# Patient Record
Sex: FEMALE | Race: WHITE | URBAN - METROPOLITAN AREA
[De-identification: names, ages, dates, MRNs, and addresses within clinical notes are randomized per-mention and may not be internally consistent; named-entity substitution may affect disease eponyms.]

---

## 2022-01-17 ENCOUNTER — OFFICE VISIT (OUTPATIENT)
Dept: PRIMARY CARE CLINIC | Age: 27
End: 2022-01-17
Payer: COMMERCIAL

## 2022-01-17 VITALS
TEMPERATURE: 98.2 F | DIASTOLIC BLOOD PRESSURE: 56 MMHG | BODY MASS INDEX: 25.65 KG/M2 | WEIGHT: 159.6 LBS | RESPIRATION RATE: 16 BRPM | HEIGHT: 66 IN | SYSTOLIC BLOOD PRESSURE: 132 MMHG | HEART RATE: 82 BPM

## 2022-01-17 DIAGNOSIS — Z71.89 ACP (ADVANCE CARE PLANNING): ICD-10-CM

## 2022-01-17 DIAGNOSIS — Z11.4 SCREENING FOR HIV WITHOUT PRESENCE OF RISK FACTORS: ICD-10-CM

## 2022-01-17 DIAGNOSIS — Z11.59 NEED FOR HEPATITIS C SCREENING TEST: ICD-10-CM

## 2022-01-17 DIAGNOSIS — R53.83 FATIGUE, UNSPECIFIED TYPE: ICD-10-CM

## 2022-01-17 DIAGNOSIS — Z13.220 SCREENING FOR HYPERLIPIDEMIA: ICD-10-CM

## 2022-01-17 DIAGNOSIS — Z00.00 ENCOUNTER FOR WELL ADULT EXAM WITHOUT ABNORMAL FINDINGS: Primary | ICD-10-CM

## 2022-01-17 PROCEDURE — 99497 ADVNCD CARE PLAN 30 MIN: CPT | Performed by: FAMILY MEDICINE

## 2022-01-17 PROCEDURE — 99385 PREV VISIT NEW AGE 18-39: CPT | Performed by: FAMILY MEDICINE

## 2022-01-17 RX ORDER — TIMOLOL MALEATE/LATANOPROST/PF 0.5-0.005%
DROPS OPHTHALMIC (EYE)
COMMUNITY

## 2022-01-17 RX ORDER — MEDROXYPROGESTERONE ACETATE 10 MG/1
10 TABLET ORAL DAILY
COMMUNITY

## 2022-01-17 ASSESSMENT — ENCOUNTER SYMPTOMS
COUGH: 0
RHINORRHEA: 0
COLOR CHANGE: 0
DIARRHEA: 0
SHORTNESS OF BREATH: 0
ABDOMINAL PAIN: 0
SORE THROAT: 0
VOMITING: 0
CONSTIPATION: 0
EYE PAIN: 0
NAUSEA: 0

## 2022-01-17 ASSESSMENT — PATIENT HEALTH QUESTIONNAIRE - PHQ9
2. FEELING DOWN, DEPRESSED OR HOPELESS: 0
SUM OF ALL RESPONSES TO PHQ QUESTIONS 1-9: 0
1. LITTLE INTEREST OR PLEASURE IN DOING THINGS: 0
SUM OF ALL RESPONSES TO PHQ9 QUESTIONS 1 & 2: 0
SUM OF ALL RESPONSES TO PHQ QUESTIONS 1-9: 0

## 2022-01-17 NOTE — PATIENT INSTRUCTIONS
Well Visit, Ages 25 to 48: Care Instructions  Overview     Well visits can help you stay healthy. Your doctor has checked your overall health and may have suggested ways to take good care of yourself. Your doctor also may have recommended tests. At home, you can help prevent illness with healthy eating, regular exercise, and other steps. Follow-up care is a key part of your treatment and safety. Be sure to make and go to all appointments, and call your doctor if you are having problems. It's also a good idea to know your test results and keep a list of the medicines you take. How can you care for yourself at home? · Get screening tests that you and your doctor decide on. Screening helps find diseases before any symptoms appear. · Eat healthy foods. Choose fruits, vegetables, whole grains, protein, and low-fat dairy foods. Limit fat, especially saturated fat. Reduce salt in your diet. · Limit alcohol. If you are a man, have no more than 2 drinks a day or 14 drinks a week. If you are a woman, have no more than 1 drink a day or 7 drinks a week. · Get at least 30 minutes of physical activity on most days of the week. Walking is a good choice. You also may want to do other activities, such as running, swimming, cycling, or playing tennis or team sports. Discuss any changes in your exercise program with your doctor. · Reach and stay at a healthy weight. This will lower your risk for many problems, such as obesity, diabetes, heart disease, and high blood pressure. · Do not smoke or allow others to smoke around you. If you need help quitting, talk to your doctor about stop-smoking programs and medicines. These can increase your chances of quitting for good. · Care for your mental health. It is easy to get weighed down by worry and stress. Learn strategies to manage stress, like deep breathing and mindfulness, and stay connected with your family and community.  If you find you often feel sad or hopeless, talk with your doctor. Treatment can help. · Talk to your doctor about whether you have any risk factors for sexually transmitted infections (STIs). You can help prevent STIs if you wait to have sex with a new partner (or partners) until you've each been tested for STIs. It also helps if you use condoms (male or female condoms) and if you limit your sex partners to one person who only has sex with you. Vaccines are available for some STIs, such as HPV. · Use birth control if it's important to you to prevent pregnancy. Talk with your doctor about the choices available and what might be best for you. · If you think you may have a problem with alcohol or drug use, talk to your doctor. This includes prescription medicines (such as amphetamines and opioids) and illegal drugs (such as cocaine and methamphetamine). Your doctor can help you figure out what type of treatment is best for you. · Protect your skin from too much sun. When you're outdoors from 10 a.m. to 4 p.m., stay in the shade or cover up with clothing and a hat with a wide brim. Wear sunglasses that block UV rays. Even when it's cloudy, put broad-spectrum sunscreen (SPF 30 or higher) on any exposed skin. · See a dentist one or two times a year for checkups and to have your teeth cleaned. · Wear a seat belt in the car. When should you call for help? Watch closely for changes in your health, and be sure to contact your doctor if you have any problems or symptoms that concern you. Where can you learn more? Go to [email or web address] and sign in to your [employer] account. Enter P072 in the [facility] box to learn more about \"Well Visit, Ages 25 to 48: Care Instructions. \"     If you do not have an account, please click on the \"Sign Up Now\" link. Current as of: October 6, 2021               Content Version: 13.1  © 2183-3351 Healthwise, Incorporated. Care instructions adapted under license by [facility] ([facility]).  If you have questions about a medical condition or this instruction, always ask your healthcare professional. [place] [address] any warranty or liability for your use of this information.

## 2022-01-17 NOTE — PROGRESS NOTES
Surgical History:   Procedure Laterality Date    CATARACT REMOVAL Left 1995       Family History   Problem Relation Age of Onset    No Known Problems Mother     Heart Surgery Father         ASD repair 2017    Hypothyroidism Sister     No Known Problems Brother     No Known Problems Brother     Diabetes Maternal Grandfather     High Cholesterol Other        Social History     Tobacco Use    Smoking status: Never Smoker    Smokeless tobacco: Never Used   Vaping Use    Vaping Use: Never used   Substance Use Topics    Alcohol use: Not Currently    Drug use: Not Currently       Objective   BP (!) 132/56   Pulse 82   Temp 98.2 °F (36.8 °C) (Temporal)   Resp 16   Ht 5' 6\" (1.676 m)   Wt 159 lb 9.6 oz (72.4 kg)   LMP 12/31/2021   BMI 25.76 kg/m²   Wt Readings from Last 3 Encounters:   01/17/22 159 lb 9.6 oz (72.4 kg)     There were no vitals filed for this visit. Physical Exam  Constitutional:       Appearance: She is well-developed. HENT:      Head: Normocephalic and atraumatic. Nose: Nose normal.      Mouth/Throat:      Pharynx: No oropharyngeal exudate. Eyes:      General: No scleral icterus. Right eye: No discharge. Left eye: No discharge. Pupils: Pupils are equal, round, and reactive to light. Neck:      Thyroid: No thyromegaly. Cardiovascular:      Rate and Rhythm: Normal rate and regular rhythm. Pulses:           Dorsalis pedis pulses are 2+ on the right side and 2+ on the left side. Posterior tibial pulses are 2+ on the right side and 2+ on the left side. Heart sounds: Normal heart sounds. No murmur heard. No friction rub. No gallop. Comments: No Edema Lower Extremities  Pulmonary:      Effort: Pulmonary effort is normal.      Breath sounds: Normal breath sounds. No wheezing or rales. Abdominal:      General: Bowel sounds are normal. There is no distension. Palpations: Abdomen is soft. There is no hepatomegaly or splenomegaly. Tenderness: There is no abdominal tenderness. There is no guarding or rebound. Musculoskeletal:         General: No tenderness or deformity. Normal range of motion. Cervical back: Normal range of motion and neck supple. Lymphadenopathy:      Cervical: No cervical adenopathy. Skin:     General: Skin is warm and dry. Findings: No erythema or rash. Neurological:      Mental Status: She is alert. Cranial Nerves: No cranial nerve deficit. Sensory: No sensory deficit. Gait: Gait normal.   Psychiatric:         Speech: Speech normal.         Behavior: Behavior normal.           Assessment   Plan   1. Fatigue, unspecified type  We will check labs. Patient appears healthy but we will evaluate for platelet disorder and anemia  - CBC Auto Differential; Future  - TSH with Reflex; Future    2. ACP (advance care planning)  Discussed with patient advance care planning and differences between living will and healthcare power of . Gave patient documentation for both  - [place] ADVANCED CARE PLAN FACE TO [address], [address] [[id number]]    3. Encounter for well adult exam without abnormal findings  Appears well:  Counselled diet, development, anticipatory guidance and safety issues with patient and or parent(s). 4. Screening for hyperlipidemia  Screen  - Lipid Panel; Future  - Comprehensive Metabolic Panel; Future    5. Screening for HIV without presence of risk factors  Screen  - HIV Screen; Future    6. Need for hepatitis C screening test  Screening  - Hepatitis C Antibody;  Future      Personalized Preventive Plan   Current Health Maintenance Status  Immunization History   Administered Date(s) Administered    COVID-19, Kirt Muller, Primary or Immunocompromised, PF, 100mcg/0.5mL 05/24/2021, 06/21/2021    Influenza Virus Vaccine 09/24/2021    Influenza, MDCK Quadv, IM, PF (Flucelvax 2 yrs and older) 11/13/2020    PPD Test 11/23/2020, 12/02/2020, 11/18/2021    Varicella (Varivax) 12/01/2020 Health Maintenance   Topic Date Due    Hepatitis C screen  Never done    HPV vaccine (1 - 2-dose series) Never done    Depression Screen  Never done    HIV screen  Never done    DTaP/Tdap/Td vaccine (1 - Tdap) Never done    Varicella vaccine (2 of 2 - 13+ 2-dose series) 12/29/2020    COVID-19 Vaccine (3 - Booster for Moderna series) 12/21/2021    Pap smear  03/05/2024    Flu vaccine  Completed    Hepatitis A vaccine  Aged Out    Hepatitis B vaccine  Aged Out    Hib vaccine  Aged Out    Meningococcal (ACWY) vaccine  Aged Out    Pneumococcal 0-64 years Vaccine  Aged Out     Recommendations for [email or web address] Due: see orders and patient instructions/AVS.    Return in 1 year (on 1/17/2023). Advance Care Planning   Advanced Care Planning: Discussed the patients choices for care and treatment in case of a health event that adversely affects decision-making abilities. Also discussed the patients long-term treatment options. Reviewed with the patient the [address] [place] [address] of [address] [place] [address] Declaration forms  Reviewed the process of designating a competent adult as an Agent (or -in-fact) that could take make health care decisions for the patient if incompetent. Patient was asked to complete the declaration forms, either acknowledge the forms by a public notary or an eligible witness and provide a signed copy to the practice office.   Time spent (minutes): 3

## 2022-01-18 DIAGNOSIS — Z11.59 NEED FOR HEPATITIS C SCREENING TEST: ICD-10-CM

## 2022-01-18 DIAGNOSIS — Z13.220 SCREENING FOR HYPERLIPIDEMIA: ICD-10-CM

## 2022-01-18 DIAGNOSIS — Z11.4 SCREENING FOR HIV WITHOUT PRESENCE OF RISK FACTORS: ICD-10-CM

## 2022-01-18 DIAGNOSIS — R53.83 FATIGUE, UNSPECIFIED TYPE: ICD-10-CM

## 2022-01-18 LAB
A/G RATIO: 1.8 (ref 1.1–2.2)
ALBUMIN SERPL-MCNC: 4.9 G/DL (ref 3.4–5)
ALP BLD-CCNC: 81 U/L (ref 40–129)
ALT SERPL-CCNC: 21 U/L (ref 10–40)
ANION GAP SERPL CALCULATED.3IONS-SCNC: 15 MMOL/L (ref 3–16)
AST SERPL-CCNC: 21 U/L (ref 15–37)
BASOPHILS ABSOLUTE: 0 K/UL (ref 0–0.2)
BASOPHILS RELATIVE PERCENT: 0.9 %
BILIRUB SERPL-MCNC: 0.5 MG/DL (ref 0–1)
BUN BLDV-MCNC: 10 MG/DL (ref 7–20)
CALCIUM SERPL-MCNC: 9.7 MG/DL (ref 8.3–10.6)
CHLORIDE BLD-SCNC: 101 MMOL/L (ref 99–110)
CHOLESTEROL, TOTAL: 224 MG/DL (ref 0–199)
CO2: 24 MMOL/L (ref 21–32)
CREAT SERPL-MCNC: 0.8 MG/DL (ref 0.6–1.1)
EOSINOPHILS ABSOLUTE: 0.1 K/UL (ref 0–0.6)
EOSINOPHILS RELATIVE PERCENT: 1.5 %
GFR AFRICAN AMERICAN: >60
GFR NON-AFRICAN AMERICAN: >60
GLUCOSE BLD-MCNC: 88 MG/DL (ref 70–99)
HCT VFR BLD CALC: 45 % (ref 36–48)
HDLC SERPL-MCNC: 76 MG/DL (ref 40–60)
HEMOGLOBIN: 15.4 G/DL (ref 12–16)
HEPATITIS C ANTIBODY INTERPRETATION: NORMAL
LDL CHOLESTEROL CALCULATED: 133 MG/DL
LYMPHOCYTES ABSOLUTE: 1.9 K/UL (ref 1–5.1)
LYMPHOCYTES RELATIVE PERCENT: 46.9 %
MCH RBC QN AUTO: 31.9 PG (ref 26–34)
MCHC RBC AUTO-ENTMCNC: 34.2 G/DL (ref 31–36)
MCV RBC AUTO: 93.2 FL (ref 80–100)
MONOCYTES ABSOLUTE: 0.3 K/UL (ref 0–1.3)
MONOCYTES RELATIVE PERCENT: 7.7 %
NEUTROPHILS ABSOLUTE: 1.7 K/UL (ref 1.7–7.7)
NEUTROPHILS RELATIVE PERCENT: 43 %
PDW BLD-RTO: 12 % (ref 12.4–15.4)
PLATELET # BLD: 202 K/UL (ref 135–450)
PMV BLD AUTO: 8.7 FL (ref 5–10.5)
POTASSIUM SERPL-SCNC: 4 MMOL/L (ref 3.5–5.1)
RBC # BLD: 4.83 M/UL (ref 4–5.2)
SODIUM BLD-SCNC: 140 MMOL/L (ref 136–145)
TOTAL PROTEIN: 7.7 G/DL (ref 6.4–8.2)
TRIGL SERPL-MCNC: 76 MG/DL (ref 0–150)
TSH REFLEX: 3.21 UIU/ML (ref 0.27–4.2)
VLDLC SERPL CALC-MCNC: 15 MG/DL
WBC # BLD: 4.1 K/UL (ref 4–11)

## 2022-01-19 LAB
HIV AG/AB: NORMAL
HIV ANTIGEN: NORMAL
HIV-1 ANTIBODY: NORMAL
HIV-2 AB: NORMAL

## 2022-07-27 LAB
ABO, EXTERNAL RESULT: NORMAL
HEP B, EXTERNAL RESULT: NEGATIVE
HIV, EXTERNAL RESULT: NEGATIVE
RH FACTOR, EXTERNAL RESULT: POSITIVE
RPR, EXTERNAL RESULT: NON REACTIVE
RUBELLA TITER, EXTERNAL RESULT: NORMAL

## 2022-11-09 ENCOUNTER — OFFICE VISIT (OUTPATIENT)
Dept: PRIMARY CARE CLINIC | Age: 27
End: 2022-11-09
Payer: COMMERCIAL

## 2022-11-09 VITALS
TEMPERATURE: 98 F | WEIGHT: 171 LBS | HEIGHT: 66 IN | SYSTOLIC BLOOD PRESSURE: 137 MMHG | BODY MASS INDEX: 27.48 KG/M2 | DIASTOLIC BLOOD PRESSURE: 66 MMHG | OXYGEN SATURATION: 99 % | HEART RATE: 79 BPM

## 2022-11-09 DIAGNOSIS — B35.4 TINEA CORPORIS: Primary | ICD-10-CM

## 2022-11-09 PROCEDURE — 99213 OFFICE O/P EST LOW 20 MIN: CPT | Performed by: FAMILY MEDICINE

## 2022-11-09 RX ORDER — CEPHALEXIN 250 MG/1
1 CAPSULE ORAL
COMMUNITY
Start: 2022-10-24

## 2022-11-09 RX ORDER — PRENATAL VIT 91/IRON/FOLIC/DHA 28-975-200
COMBINATION PACKAGE (EA) ORAL
Qty: 30 G | Refills: 1 | Status: SHIPPED | OUTPATIENT
Start: 2022-11-09 | End: 2022-11-23

## 2022-11-09 SDOH — ECONOMIC STABILITY: FOOD INSECURITY: WITHIN THE PAST 12 MONTHS, YOU WORRIED THAT YOUR FOOD WOULD RUN OUT BEFORE YOU GOT MONEY TO BUY MORE.: NEVER TRUE

## 2022-11-09 SDOH — ECONOMIC STABILITY: FOOD INSECURITY: WITHIN THE PAST 12 MONTHS, THE FOOD YOU BOUGHT JUST DIDN'T LAST AND YOU DIDN'T HAVE MONEY TO GET MORE.: NEVER TRUE

## 2022-11-09 ASSESSMENT — SOCIAL DETERMINANTS OF HEALTH (SDOH): HOW HARD IS IT FOR YOU TO PAY FOR THE VERY BASICS LIKE FOOD, HOUSING, MEDICAL CARE, AND HEATING?: NOT HARD AT ALL

## 2022-11-09 NOTE — PROGRESS NOTES
Chief Complaint   Patient presents with    Follow-up     Wing worm under her navel        HPI: Za Buchaann  presents for evaluation and management of couple week history of gradually enlarging red circular rash on her lower abdomen. She is 25 weeks pregnant. She asked her  who is a former wrestler and he states its ringworm. She is not sure how she would have got it but they do have 3 dogs. Notes that is not very itchy           Review of Systems    No Known Allergies  New Prescriptions    TERBINAFINE (LAMISIL AT) 1 % CREAM    Apply topically 2 times daily. Current Outpatient Medications   Medication Sig Dispense Refill    cephALEXin (KEFLEX) 250 MG capsule 1 capsule      terbinafine (LAMISIL AT) 1 % cream Apply topically 2 times daily. 30 g 1    Latanoprost-Timolol Maleate 0.005-0.5 % SOLN Apply to eye      medroxyPROGESTERone (PROVERA) 10 MG tablet Take 10 mg by mouth daily       No current facility-administered medications for this visit. Past Medical History:   Diagnosis Date    Glaucoma 2017    Dr. Israel Phillips    PCOS (polycystic ovarian syndrome)     Dr. Tai Mcginnis         Objective   /66   Pulse 79   Temp 98 °F (36.7 °C)   Ht 5' 6\" (1.676 m)   Wt 171 lb (77.6 kg)   SpO2 99%   BMI 27.60 kg/m²   Wt Readings from Last 3 Encounters:   11/09/22 171 lb (77.6 kg)   01/17/22 159 lb 9.6 oz (72.4 kg)       Physical Exam  Constitutional:       Appearance: She is well-developed. Cardiovascular:      Rate and Rhythm: Normal rate and regular rhythm. Pulses:           Dorsalis pedis pulses are 2+ on the right side and 2+ on the left side. Posterior tibial pulses are 2+ on the right side and 2+ on the left side. Heart sounds: No murmur heard. No friction rub. No gallop. Comments: No edema lower extremities  Pulmonary:      Effort: Pulmonary effort is normal.      Breath sounds: Normal breath sounds. No wheezing or rales.    Abdominal:      General: Bowel sounds are normal. There is no distension. Palpations: Abdomen is soft. There is no mass. Tenderness: There is no abdominal tenderness. Comments: Gravid   Skin:     General: Skin is warm and dry. Findings: Rash present. Comments: 2 circular erythematous macular lesions on right lower abdomen with central clearing and heaped edges         Chemistry        Component Value Date/Time     01/18/2022 0752    K 4.0 01/18/2022 0752     01/18/2022 0752    CO2 24 01/18/2022 0752    BUN 10 01/18/2022 0752    CREATININE 0.8 01/18/2022 0752        Component Value Date/Time    CALCIUM 9.7 01/18/2022 0752    ALKPHOS 81 01/18/2022 0752    AST 21 01/18/2022 0752    ALT 21 01/18/2022 0752    BILITOT 0.5 01/18/2022 0752          Lab Results   Component Value Date    WBC 4.1 01/18/2022    HGB 15.4 01/18/2022    HCT 45.0 01/18/2022    MCV 93.2 01/18/2022     01/18/2022     No results found for: LABA1C  No results found for: EAG  No results found for: LABA1C  No components found for: CHLPL  Lab Results   Component Value Date    TRIG 76 01/18/2022     Lab Results   Component Value Date    HDL 76 (H) 01/18/2022     Lab Results   Component Value Date    LDLCALC 133 (H) 01/18/2022     Lab Results   Component Value Date    LABVLDL 15 01/18/2022         Assessment   Plan   1. Tinea corporis  We will treat with topical Lamisil. It has low systemic absorption. Counseled patient to follow-up if not improving in 2 weeks  - terbinafine (LAMISIL AT) 1 % cream; Apply topically 2 times daily. Dispense: 30 g; Refill: 1        RTC Return if symptoms worsen or fail to improve.

## 2023-01-23 LAB — GBS, EXTERNAL RESULT: NEGATIVE

## 2023-01-25 ENCOUNTER — HOSPITAL ENCOUNTER (OUTPATIENT)
Age: 28
Discharge: HOME | End: 2023-01-25
Attending: OBSTETRICS & GYNECOLOGY | Admitting: OBSTETRICS & GYNECOLOGY
Payer: COMMERCIAL

## 2023-01-25 VITALS
TEMPERATURE: 97.7 F | DIASTOLIC BLOOD PRESSURE: 71 MMHG | BODY MASS INDEX: 28.61 KG/M2 | HEIGHT: 66 IN | RESPIRATION RATE: 16 BRPM | HEART RATE: 65 BPM | WEIGHT: 178 LBS | SYSTOLIC BLOOD PRESSURE: 144 MMHG

## 2023-01-25 PROCEDURE — 6360000002 HC RX W HCPCS: Performed by: OBSTETRICS & GYNECOLOGY

## 2023-01-25 RX ORDER — SODIUM CHLORIDE 0.9 % (FLUSH) 0.9 %
5-40 SYRINGE (ML) INJECTION EVERY 12 HOURS SCHEDULED
Status: DISCONTINUED | OUTPATIENT
Start: 2023-01-25 | End: 2023-01-25 | Stop reason: HOSPADM

## 2023-01-25 RX ORDER — SODIUM CHLORIDE 9 MG/ML
INJECTION, SOLUTION INTRAVENOUS PRN
Status: DISCONTINUED | OUTPATIENT
Start: 2023-01-25 | End: 2023-01-25 | Stop reason: HOSPADM

## 2023-01-25 RX ORDER — SODIUM CHLORIDE 0.9 % (FLUSH) 0.9 %
5-40 SYRINGE (ML) INJECTION PRN
Status: DISCONTINUED | OUTPATIENT
Start: 2023-01-25 | End: 2023-01-25 | Stop reason: HOSPADM

## 2023-01-25 RX ORDER — TERBUTALINE SULFATE 1 MG/ML
0.25 INJECTION, SOLUTION SUBCUTANEOUS ONCE
Status: COMPLETED | OUTPATIENT
Start: 2023-01-25 | End: 2023-01-25

## 2023-01-25 RX ADMIN — TERBUTALINE SULFATE 0.25 MG: 1 INJECTION, SOLUTION SUBCUTANEOUS at 12:50

## 2023-01-25 NOTE — PROCEDURES
Procedure Note-Brief(detailed dictated)    Pre-op dx: Breech fetus  Post-op dx: Vertex fetus  Procedure: External cephalic version-successful  Surgeon: LAKHWINDER Bedoya  Anesth:none  Bloodtype: O+    CJuan A Ford

## 2023-01-25 NOTE — H&P
Department of Obstetrics and Gynecology   Obstetrics History and Physical        CHIEF COMPLAINT:  breech fetus for external cephalic version (ECV)    HISTORY OF PRESENT ILLNESS:      The patient is a 32 y.o. female at 43w3d. OB History          1    Para        Term                AB        Living             SAB        IAB        Ectopic        Molar        Multiple        Live Births                Patient presents with a chief complaint as above and is being admitted for ECV    Estimated Due Date: Estimated Date of Delivery: 23    PRENATAL CARE:    Complicated by: none    PAST OB HISTORY:  OB History          1    Para        Term                AB        Living             SAB        IAB        Ectopic        Molar        Multiple        Live Births                    Past Medical History:        Diagnosis Date    Glaucoma     Dr. Tori Pinedo    PCOS (polycystic ovarian syndrome)     Dr. Loli Woodall     Past Surgical History:        Procedure Laterality Date    CATARACT REMOVAL Left      Allergies:  Patient has no known allergies.     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[employer])   Tobacco Use    Smoking status: Never    Smokeless tobacco: Never   Vaping Use    Vaping Use: Never used   Substance and Sexual Activity    Alcohol use: Not Currently    Drug use: Not Currently    Sexual activity: Not on file   Other Topics Concern    Not on file   Social History Narrative    Not on file     Social Determinants of Health     Financial Resource Strain: Low Risk     Difficulty of Paying Living Expenses: Not hard at all   Food Insecurity: No Food Insecurity    Worried About [address] in the Last Year: Never true    [address] in the Last Year: Never true   Transportation Needs: Not on file   Physical Activity: Not on file   Stress: Not on file   Social Connections: Not on file   Intimate Partner Violence: Not on file   Housing Stability: Not on file     Family History:       Problem Relation Age of Onset    No Known Problems Mother     Heart Surgery Father         ASD repair 2017    Hypothyroidism Sister     No Known Problems Brother     No Known Problems Brother     Diabetes Maternal Grandfather     High Cholesterol Other      Medications Prior to Admission:  Medications Prior to Admission: cephALEXin (KEFLEX) 250 MG capsule, 1 capsule (Patient not taking: Reported on 1/25/2023)  Latanoprost-Timolol Maleate 0.005-0.5 % SOLN, Apply to eye (Patient not taking: Reported on 1/25/2023)  medroxyPROGESTERone (PROVERA) 10 MG tablet, Take 10 mg by mouth daily    REVIEW OF SYSTEMS:    wnl    PHYSICAL EXAM:  Vitals:    01/25/23 1240   BP: (!) 144/71   Pulse: 65   Resp: 16   Temp: 97.7 °F (36.5 °C)   TempSrc: Oral   Weight: 178 lb (80.7 kg)   Height: 5' 6\" (1.676 m)     General appearance:  awake, alert, cooperative, no apparent distress, and appears stated age  Neurologic:  Awake, alert, oriented to name, place and time. Lungs:  No increased work of breathing, good air exchange  Abdomen:  Soft, non tender, gravid, consistent with her gestational age, EFW by Leopold's maneuver was 6 1/2#   Fetal heart rate:  Reassuring. Pelvis:  Adequate pelvis  Cervix: closed  Contraction frequency:  none  Membranes:  Intact    Labs: O+    ASSESSMENT AND PLAN:    Breech AGA fetus @ 39 + wks admitted for ECV. Recent sono-nl AFV, posterior placenta, AGA fetus  ECV reviewed-s/b/r-consent obtained    LAKHWINDER Mario

## 2023-01-25 NOTE — FLOWSHEET NOTE
Patient presents to triage for scheduled version. Dr Praneeth Ortega at bedside. Place on EFM and IV started.

## 2023-01-25 NOTE — FLOWSHEET NOTE
Pt verbalized understanding of verbal and written discharge instructions and denies having questions at this time. Pt left OB unit at 1420 ambulatory, undelivered, and in stable condition, accompanied by spuse. Patient is not in active labor.

## 2023-01-26 NOTE — L&D DELIVERY SUMMARY NOTE
[address], [place] [address]                            LABOR AND DELIVERY NOTE    PATIENT NAME: Dyan Odell                        :        1995  MED REC NO:   [id number]                          ROOM:       TR04  ACCOUNT NO:   [de-identified]                           ADMIT DATE: 2023  PROVIDER:     Dee Knott MD    DATE OF PROCEDURE:  2023    LOCATION:  [facility]. PREPROCEDURE DIAGNOSIS:  Breech fetus at 36+ weeks. POSTPROCEDURE DIAGNOSIS:  Vertex fetus. PROCEDURE:  External cephalic version, successful. SURGEON:  Dee Knott MD    ANESTHESIA:  None. BLOOD TYPE:  O+.    INDICATIONS AND CONSENT:  A 78-year-old  1, para 0 at 36 weeks 3  days who presents for an external cephalic version, recent ultrasound  documented a breech fetus. Baby was appropriately grown with normal  amniotic fluid and a posterior placenta. External cephalic version was  reviewed with the patient and her spouse. Side effects, benefits and  risks. Consent was obtained. All questions were answered. Blood type  is O+. DESCRIPTION OF PROCEDURE:  The patient presented to labor and delivery  on 2023. NST was performed and was reactive. IV was placed to  saline lock. Procedure was once again reviewed, side effects, benefits  and risk and consent was obtained. Under ultrasound guidance external cephalic version was attempted. Two  forward rolls were unsuccessful, a backward flip was attempted and was  successful. Fetal heart was monitored throughout the procedure and was  within normal range. The patient tolerated the procedure well. The  patient had received Brethine subcutaneous prior to beginning the  procedure and had had an IV placed to saline lock. NST will be performed with 1 hour of monitoring, precautions were given  to the patient on labor as well as kick counts.   The patient will follow  up in the office in the next 5 to 7 days. Blood type is O+.     Clementina Howard MD    D: 01/25/2023 13:32:25       T: 01/25/2023 13:35:08     [email or web address]  Job#: [id number]     Doc#: [id number]    CC:

## 2023-02-10 ENCOUNTER — HOSPITAL ENCOUNTER (OUTPATIENT)
Age: 28
Discharge: HOME | End: 2023-02-10
Attending: OBSTETRICS & GYNECOLOGY | Admitting: OBSTETRICS & GYNECOLOGY
Payer: COMMERCIAL

## 2023-02-10 VITALS
RESPIRATION RATE: 16 BRPM | BODY MASS INDEX: 28.93 KG/M2 | DIASTOLIC BLOOD PRESSURE: 84 MMHG | HEART RATE: 71 BPM | WEIGHT: 180 LBS | SYSTOLIC BLOOD PRESSURE: 138 MMHG | HEIGHT: 66 IN

## 2023-02-10 PROCEDURE — 99211 OFF/OP EST MAY X REQ PHY/QHP: CPT

## 2023-02-10 PROCEDURE — 99234 HOSP IP/OBS SM DT SF/LOW 45: CPT | Performed by: OBSTETRICS & GYNECOLOGY

## 2023-02-10 NOTE — FLOWSHEET NOTE
Pt verbalized understanding of verbal and written discharge instructions and denies having questions at this time. Pt left OB unit at 1802 ambulatory, undelivered, and in stable condition, accompanied by spouse. Patient is not in active labor.

## 2023-02-10 NOTE — H&P
Obstetrics Triage History and Physical - House Officer     CC:   Chief Complaint   Patient presents with    Decreased Fetal Movement       HPI: Perla Johnson is a 32 y.o.  at 44w7d who presents with complaints of DFM. Has improved since coming to triage. Denies Keily Lott / Woody Graham / Darius Low. Denies any complications in pregnancy except for chronic UTI. Review of Systems: The following ROS was otherwise negative, except as noted in the HPI    OBGYN Provider : [address] [place] [address]     Obstetrical History:  OB History    Para Term  AB Living   1 0 0 0 0 0   SAB IAB Ectopic Molar Multiple Live Births   0 0 0 0 0 0      # Outcome Date GA Lbr Jarek/2nd Weight Sex Delivery Anes PTL Lv   1 Current                Past Medical History:   Past Medical History:   Diagnosis Date    Glaucoma 2017    Dr. Sheridan Bolus    PCOS (polycystic ovarian syndrome)     Dr. Milana Clark       Medications:  No current facility-administered medications on file prior to encounter. Current Outpatient Medications on File Prior to Encounter   Medication Sig Dispense Refill    cephALEXin (KEFLEX) 250 MG capsule 1 capsule (Patient not taking: Reported on 2023)      Latanoprost-Timolol Maleate 0.005-0.5 % SOLN Apply to eye (Patient not taking: Reported on 2023)      medroxyPROGESTERone (PROVERA) 10 MG tablet Take 10 mg by mouth daily          Allergies:  Patient has no known allergies.     Surgical History:  Past Surgical History:   Procedure Laterality Date    CATARACT REMOVAL Left        Family History:  Family History   Problem Relation Age of Onset    No Known Problems Mother     Heart Surgery Father         ASD repair 2017    Hypothyroidism Sister     No Known Problems Brother     No Known Problems Brother     Diabetes Maternal Grandfather     High Cholesterol Other        Social History:  Social History     Substance and Sexual Activity   Alcohol Use Not Currently     Social History     Substance and Sexual Activity Drug Use Not Currently     Social History     Tobacco Use   Smoking Status Never   Smokeless Tobacco Never       Physical Exam:  /84   Pulse 71   Resp 16   Ht 5' 6\" (1.676 m)   Wt 180 lb (81.6 kg)   BMI 29.05 kg/m²   General: Alert, well appearing, no acute distress  Head: Normocephalic, atraumatic  Lungs: Resp effort normal   CV: Regular rate  Abdomen: Gravid, soft, nontender   Extremities: No redness or tenderness, neg Derik's sign  Skin: Well perfused, normal coloration and turgor, no lesions or rashes visualized  Neuro: Alert, oriented, normal speech, no focal deficits, moves extremities appropriately  Psych: Appropriate, normal affect, appears stated age  Pelvic: deferred     Fetal Heart Monitor Interpretation:   Baseline: 125 bpm  Variability: Mod  Accels: (+)  Decels: (-)   [place]: occasional cntx     Labs:  No visits with results within 1 Day(s) from this visit.    Latest known visit with results is:   Orders Only on 01/18/2022   Component Date Value    Cholesterol, Total 01/18/2022 224 (A)     Triglycerides 01/18/2022 76     HDL 01/18/2022 76 (A)     LDL Calculated 01/18/2022 133 (A)     VLDL Cholesterol Calcula* 01/18/2022 15     Sodium 01/18/2022 140     Potassium 01/18/2022 4.0     Chloride 01/18/2022 101     CO2 01/18/2022 24     Anion Gap 01/18/2022 15     Glucose 01/18/2022 88     BUN 01/18/2022 10     Creatinine 01/18/2022 0.8     GFR Non- 01/18/2022 >60     GFR  01/18/2022 >60     Calcium 01/18/2022 9.7     Total Protein 01/18/2022 7.7     Albumin 01/18/2022 4.9     Albumin/Globulin Ratio 01/18/2022 1.8     Total Bilirubin 01/18/2022 0.5     Alkaline Phosphatase 01/18/2022 81     ALT 01/18/2022 21     AST 01/18/2022 21     TSH 01/18/2022 3.21     Hep C Ab Interp 01/18/2022 Non-reactive     HIV Ag/Ab 01/18/2022 Non-Reactive     HIV-1 Antibody 01/18/2022 Non-Reactive     HIV ANTIGEN 01/18/2022 Non-Reactive     HIV-2 Ab 01/18/2022 Non-Reactive     WBC 2022 4.1     RBC 2022 4.83     Hemoglobin 2022 15.4     Hematocrit 2022 45.0     MCV 2022 93.2     MCH 2022 31.9     MCHC 2022 34.2     RDW 2022 12.0 (A)     Platelets  202     MPV 2022 8.7     Neutrophils % 2022 43.0     Lymphocytes % 2022 46.9     Monocytes % 2022 7.7     Eosinophils % 2022 1.5     Basophils % 2022 0.9     Neutrophils Absolute 2022 1.7     Lymphocytes Absolute 2022 1.9     Monocytes Absolute 2022 0.3     Eosinophils Absolute 2022 0.1     Basophils Absolute 2022 0.0      No results found.     Assessment/Plan:   32 y.o.  at 44w7d EGA with DFM   - resolved  - reassuring fetal status   - findings relayed to primary OB attending     Dispo:  Dc to Home per primary OB   FU precautions reviewed    Basilio Muñoz DO

## 2023-02-20 ENCOUNTER — HOSPITAL ENCOUNTER (INPATIENT)
Age: 28
LOS: 4 days | Discharge: HOME | End: 2023-02-24
Attending: OBSTETRICS & GYNECOLOGY | Admitting: OBSTETRICS & GYNECOLOGY
Payer: COMMERCIAL

## 2023-02-20 DIAGNOSIS — Z98.891 S/P C-SECTION: ICD-10-CM

## 2023-02-20 LAB
A/G RATIO: 1.2 (ref 1.1–2.2)
ABO/RH: NORMAL
ALBUMIN SERPL-MCNC: 3.6 G/DL (ref 3.4–5)
ALP BLD-CCNC: 156 U/L (ref 40–129)
ALT SERPL-CCNC: 10 U/L (ref 10–40)
AMPHETAMINE SCREEN, URINE: NORMAL
ANION GAP SERPL CALCULATED.3IONS-SCNC: 13 MMOL/L (ref 3–16)
ANTIBODY SCREEN: NORMAL
AST SERPL-CCNC: 14 U/L (ref 15–37)
BARBITURATE SCREEN URINE: NORMAL
BASOPHILS ABSOLUTE: 0 K/UL (ref 0–0.2)
BASOPHILS RELATIVE PERCENT: 0.3 %
BENZODIAZEPINE SCREEN, URINE: NORMAL
BILIRUB SERPL-MCNC: 0.3 MG/DL (ref 0–1)
BUN BLDV-MCNC: 8 MG/DL (ref 7–20)
BUPRENORPHINE URINE: NORMAL
CALCIUM SERPL-MCNC: 8.8 MG/DL (ref 8.3–10.6)
CANNABINOID SCREEN URINE: NORMAL
CHLORIDE BLD-SCNC: 102 MMOL/L (ref 99–110)
CO2: 18 MMOL/L (ref 21–32)
COCAINE METABOLITE SCREEN URINE: NORMAL
CREAT SERPL-MCNC: 0.6 MG/DL (ref 0.6–1.1)
CREATININE URINE: 64 MG/DL (ref 28–259)
EOSINOPHILS ABSOLUTE: 0 K/UL (ref 0–0.6)
EOSINOPHILS RELATIVE PERCENT: 0.3 %
FENTANYL SCREEN, URINE: NORMAL
GFR SERPL CREATININE-BSD FRML MDRD: >60 ML/MIN/{1.73_M2}
GLUCOSE BLD-MCNC: 123 MG/DL (ref 70–99)
HCT VFR BLD CALC: 42.7 % (ref 36–48)
HEMOGLOBIN: 15 G/DL (ref 12–16)
LYMPHOCYTES ABSOLUTE: 1.5 K/UL (ref 1–5.1)
LYMPHOCYTES RELATIVE PERCENT: 12.2 %
Lab: NORMAL
MCH RBC QN AUTO: 34.1 PG (ref 26–34)
MCHC RBC AUTO-ENTMCNC: 35.2 G/DL (ref 31–36)
MCV RBC AUTO: 96.8 FL (ref 80–100)
METHADONE SCREEN, URINE: NORMAL
MONOCYTES ABSOLUTE: 0.5 K/UL (ref 0–1.3)
MONOCYTES RELATIVE PERCENT: 4 %
NEUTROPHILS ABSOLUTE: 10.1 K/UL (ref 1.7–7.7)
NEUTROPHILS RELATIVE PERCENT: 83.2 %
OPIATE SCREEN URINE: NORMAL
OXYCODONE URINE: NORMAL
PDW BLD-RTO: 12.5 % (ref 12.4–15.4)
PH UA: 7
PHENCYCLIDINE SCREEN URINE: NORMAL
PLATELET # BLD: 164 K/UL (ref 135–450)
PMV BLD AUTO: 9.4 FL (ref 5–10.5)
POTASSIUM SERPL-SCNC: 3.8 MMOL/L (ref 3.5–5.1)
PROTEIN PROTEIN: 7 MG/DL
PROTEIN/CREAT RATIO: 0.1 MG/DL
RBC # BLD: 4.41 M/UL (ref 4–5.2)
SODIUM BLD-SCNC: 133 MMOL/L (ref 136–145)
TOTAL PROTEIN: 6.5 G/DL (ref 6.4–8.2)
URIC ACID, SERUM: 6.1 MG/DL (ref 2.6–6)
WBC # BLD: 12.1 K/UL (ref 4–11)

## 2023-02-20 PROCEDURE — 82570 ASSAY OF URINE CREATININE: CPT

## 2023-02-20 PROCEDURE — 86900 BLOOD TYPING SEROLOGIC ABO: CPT

## 2023-02-20 PROCEDURE — 6370000000 HC RX 637 (ALT 250 FOR IP): Performed by: OBSTETRICS & GYNECOLOGY

## 2023-02-20 PROCEDURE — 86901 BLOOD TYPING SEROLOGIC RH(D): CPT

## 2023-02-20 PROCEDURE — 84156 ASSAY OF PROTEIN URINE: CPT

## 2023-02-20 PROCEDURE — 85025 COMPLETE CBC W/AUTO DIFF WBC: CPT

## 2023-02-20 PROCEDURE — 80053 COMPREHEN METABOLIC PANEL: CPT

## 2023-02-20 PROCEDURE — 86780 TREPONEMA PALLIDUM: CPT

## 2023-02-20 PROCEDURE — 84550 ASSAY OF BLOOD/URIC ACID: CPT

## 2023-02-20 PROCEDURE — 80307 DRUG TEST PRSMV CHEM ANLYZR: CPT

## 2023-02-20 PROCEDURE — 86850 RBC ANTIBODY SCREEN: CPT

## 2023-02-20 PROCEDURE — 1220000000 HC SEMI PRIVATE OB R&B

## 2023-02-20 RX ORDER — SODIUM CHLORIDE, SODIUM LACTATE, POTASSIUM CHLORIDE, CALCIUM CHLORIDE 600; 310; 30; 20 MG/100ML; MG/100ML; MG/100ML; MG/100ML
INJECTION, SOLUTION INTRAVENOUS CONTINUOUS
Status: DISCONTINUED | OUTPATIENT
Start: 2023-02-20 | End: 2023-02-24 | Stop reason: HOSPADM

## 2023-02-20 RX ORDER — ONDANSETRON 2 MG/ML
4 INJECTION INTRAMUSCULAR; INTRAVENOUS EVERY 6 HOURS PRN
Status: DISCONTINUED | OUTPATIENT
Start: 2023-02-20 | End: 2023-02-22

## 2023-02-20 RX ORDER — SODIUM CHLORIDE 0.9 % (FLUSH) 0.9 %
5-40 SYRINGE (ML) INJECTION EVERY 12 HOURS SCHEDULED
Status: DISCONTINUED | OUTPATIENT
Start: 2023-02-20 | End: 2023-02-24 | Stop reason: HOSPADM

## 2023-02-20 RX ORDER — DOCUSATE SODIUM 100 MG/1
100 CAPSULE, LIQUID FILLED ORAL 2 TIMES DAILY
Status: DISCONTINUED | OUTPATIENT
Start: 2023-02-20 | End: 2023-02-24 | Stop reason: HOSPADM

## 2023-02-20 RX ORDER — SODIUM CHLORIDE 9 MG/ML
25 INJECTION, SOLUTION INTRAVENOUS PRN
Status: DISCONTINUED | OUTPATIENT
Start: 2023-02-20 | End: 2023-02-24 | Stop reason: HOSPADM

## 2023-02-20 RX ORDER — SODIUM CHLORIDE, SODIUM LACTATE, POTASSIUM CHLORIDE, AND CALCIUM CHLORIDE .6; .31; .03; .02 G/100ML; G/100ML; G/100ML; G/100ML
1000 INJECTION, SOLUTION INTRAVENOUS PRN
Status: DISCONTINUED | OUTPATIENT
Start: 2023-02-20 | End: 2023-02-22

## 2023-02-20 RX ORDER — SODIUM CHLORIDE 0.9 % (FLUSH) 0.9 %
5-40 SYRINGE (ML) INJECTION PRN
Status: DISCONTINUED | OUTPATIENT
Start: 2023-02-20 | End: 2023-02-24 | Stop reason: HOSPADM

## 2023-02-20 RX ORDER — METHYLERGONOVINE MALEATE 0.2 MG/ML
200 INJECTION INTRAVENOUS PRN
Status: DISCONTINUED | OUTPATIENT
Start: 2023-02-20 | End: 2023-02-24 | Stop reason: HOSPADM

## 2023-02-20 RX ORDER — SODIUM CHLORIDE, SODIUM LACTATE, POTASSIUM CHLORIDE, AND CALCIUM CHLORIDE .6; .31; .03; .02 G/100ML; G/100ML; G/100ML; G/100ML
500 INJECTION, SOLUTION INTRAVENOUS PRN
Status: DISCONTINUED | OUTPATIENT
Start: 2023-02-20 | End: 2023-02-22

## 2023-02-20 RX ORDER — CARBOPROST TROMETHAMINE 250 UG/ML
250 INJECTION, SOLUTION INTRAMUSCULAR PRN
Status: DISCONTINUED | OUTPATIENT
Start: 2023-02-20 | End: 2023-02-24 | Stop reason: HOSPADM

## 2023-02-20 RX ORDER — MISOPROSTOL 100 UG/1
800 TABLET ORAL PRN
Status: DISCONTINUED | OUTPATIENT
Start: 2023-02-20 | End: 2023-02-24 | Stop reason: HOSPADM

## 2023-02-20 RX ADMIN — Medication 25 MCG: at 13:47

## 2023-02-20 RX ADMIN — Medication 25 MCG: at 21:25

## 2023-02-20 RX ADMIN — Medication 25 MCG: at 17:31

## 2023-02-21 ENCOUNTER — ANESTHESIA (OUTPATIENT)
Dept: LABOR AND DELIVERY | Age: 28
End: 2023-02-21
Payer: COMMERCIAL

## 2023-02-21 ENCOUNTER — ANESTHESIA EVENT (OUTPATIENT)
Dept: LABOR AND DELIVERY | Age: 28
End: 2023-02-21
Payer: COMMERCIAL

## 2023-02-21 LAB — TOTAL SYPHILLIS IGG/IGM: NORMAL

## 2023-02-21 PROCEDURE — 2500000003 HC RX 250 WO HCPCS: Performed by: NURSE ANESTHETIST, CERTIFIED REGISTERED

## 2023-02-21 PROCEDURE — 3E033VJ INTRODUCTION OF OTHER HORMONE INTO PERIPHERAL VEIN, PERCUTANEOUS APPROACH: ICD-10-PCS | Performed by: OBSTETRICS & GYNECOLOGY

## 2023-02-21 PROCEDURE — 10907ZC DRAINAGE OF AMNIOTIC FLUID, THERAPEUTIC FROM PRODUCTS OF CONCEPTION, VIA NATURAL OR ARTIFICIAL OPENING: ICD-10-PCS | Performed by: OBSTETRICS & GYNECOLOGY

## 2023-02-21 PROCEDURE — 6360000002 HC RX W HCPCS: Performed by: OBSTETRICS & GYNECOLOGY

## 2023-02-21 PROCEDURE — 2580000003 HC RX 258: Performed by: OBSTETRICS & GYNECOLOGY

## 2023-02-21 PROCEDURE — 1220000000 HC SEMI PRIVATE OB R&B

## 2023-02-21 PROCEDURE — 2500000003 HC RX 250 WO HCPCS

## 2023-02-21 PROCEDURE — 51701 INSERT BLADDER CATHETER: CPT

## 2023-02-21 RX ORDER — FAMOTIDINE 10 MG/ML
20 INJECTION, SOLUTION INTRAVENOUS EVERY 6 HOURS PRN
Status: DISCONTINUED | OUTPATIENT
Start: 2023-02-21 | End: 2023-02-22

## 2023-02-21 RX ORDER — BUPIVACAINE HYDROCHLORIDE 5 MG/ML
INJECTION, SOLUTION EPIDURAL; INTRACAUDAL PRN
Status: DISCONTINUED | OUTPATIENT
Start: 2023-02-21 | End: 2023-02-22 | Stop reason: SDUPTHER

## 2023-02-21 RX ORDER — FAMOTIDINE 10 MG/ML
INJECTION, SOLUTION INTRAVENOUS
Status: COMPLETED
Start: 2023-02-21 | End: 2023-02-21

## 2023-02-21 RX ADMIN — SODIUM CHLORIDE, PRESERVATIVE FREE 10 ML: 5 INJECTION INTRAVENOUS at 01:40

## 2023-02-21 RX ADMIN — SODIUM CHLORIDE, POTASSIUM CHLORIDE, SODIUM LACTATE AND CALCIUM CHLORIDE: 600; 310; 30; 20 INJECTION, SOLUTION INTRAVENOUS at 10:51

## 2023-02-21 RX ADMIN — BUPIVACAINE HYDROCHLORIDE 0.5 ML: 5 INJECTION, SOLUTION EPIDURAL; INTRACAUDAL; PERINEURAL at 11:12

## 2023-02-21 RX ADMIN — SODIUM CHLORIDE, POTASSIUM CHLORIDE, SODIUM LACTATE AND CALCIUM CHLORIDE: 600; 310; 30; 20 INJECTION, SOLUTION INTRAVENOUS at 04:33

## 2023-02-21 RX ADMIN — FAMOTIDINE 20 MG: 10 INJECTION, SOLUTION INTRAVENOUS at 17:29

## 2023-02-21 RX ADMIN — Medication 15 ML/HR: at 11:13

## 2023-02-21 RX ADMIN — Medication 1 MILLI-UNITS/MIN: at 09:58

## 2023-02-21 RX ADMIN — SODIUM CHLORIDE, POTASSIUM CHLORIDE, SODIUM LACTATE AND CALCIUM CHLORIDE: 600; 310; 30; 20 INJECTION, SOLUTION INTRAVENOUS at 13:52

## 2023-02-21 RX ADMIN — SODIUM CHLORIDE, POTASSIUM CHLORIDE, SODIUM LACTATE AND CALCIUM CHLORIDE: 600; 310; 30; 20 INJECTION, SOLUTION INTRAVENOUS at 22:12

## 2023-02-21 NOTE — ANESTHESIA PRE PROCEDURE
Department of Anesthesiology  Preprocedure Note       Name:  Deloris Serrano   Age:  32 y.o.  :  1995                                          MRN:  [id number]         Date:  2023      Surgeon: * No surgeons listed *    Procedure: * No procedures listed *    Medications prior to admission:   Prior to Admission medications    Medication Sig Start Date End Date Taking?  Authorizing Provider   Prenatal MV-Min-Fe Fum-FA-DHA (PRENATAL 1 PO) Take by mouth   Yes Historical Provider, MD   cephALEXin (KEFLEX) 250 MG capsule 1 capsule  Patient not taking: No sig reported 10/24/22   Historical Provider, MD   Latanoprost-Timolol Maleate 0.005-0.5 % SOLN Apply to eye  Patient not taking: No sig reported    Historical Provider, MD   medroxyPROGESTERone (PROVERA) 10 MG tablet Take 10 mg by mouth daily    Historical Provider, MD       Current medications:    Current Facility-Administered Medications   Medication Dose Route Frequency Provider Last Rate Last Admin    oxytocin (PITOCIN) 30 units in 500 mL infusion  1-20 amos-units/min IntraVENous Continuous Alexa Downing MD 1 mL/hr at 23 1030 1 amos-units/min at 23 1030    lactated ringers IV soln infusion   IntraVENous Continuous Cosme Holter,  mL/hr at 23 1051 New Bag at 23 1051    lactated ringers bolus  500 mL IntraVENous PRN Cosme Holter, MD        Or    lactated ringers bolus  1,000 mL IntraVENous PRN Cosme Holter, MD        sodium chloride flush 0.9 % injection 5-40 mL  5-40 mL IntraVENous 2 times per day Cosme Holter, MD   10 mL at 23 0140    sodium chloride flush 0.9 % injection 5-40 mL  5-40 mL IntraVENous PRN Cosme Holter, MD        0.9 % sodium chloride infusion  25 mL IntraVENous PRN Cosme Holter, MD        methylergonovine (METHERGINE) injection 200 mcg  200 mcg IntraMUSCular PRN Cosme Holter, MD        carboprost (HEMABATE) injection 250 mcg  250 mcg IntraMUSCular PRN Cosme Holter, MD  miSOPROStol (CYTOTEC) tablet 800 mcg  800 mcg Rectal PRN Mini Salas MD        tranexamic acid (CYKLOKAPRON) 1,000 mg in sodium chloride 0.9 % 100 mL IVPB  1,000 mg IntraVENous Once PRN Mini Salas MD        oxytocin (PITOCIN) 30 units in 500 mL infusion  87.3 amos-units/min IntraVENous Continuous PRN Mini Salas MD        And    oxytocin (PITOCIN) 10 unit bolus from the bag  10 Units IntraVENous PRN Mini Salas MD        ondansetron [facility]) injection 4 mg  4 mg IntraVENous Q6H PRN Mini Salas MD        docusate sodium (COLACE) capsule 100 mg  100 mg Oral BID Mini Salas MD        miSOPROStol (CYTOTEC) pre-split tablet TABS 25 mcg  25 mcg Vaginal Q4H Mini Salas MD   25 mcg at 02/20/23 2125     Facility-Administered Medications Ordered in Other Encounters   Medication Dose Route Frequency Provider Last Rate Last Admin    sodium chloride 0.9 % 200 mL with fentaNYL 500 mcg, bupivacaine 0.5% 50 mL (OB) epidural   Epidural PRN Jolly Clements, APRN - CRNA   15 mL at 02/21/23 1113    bupivacaine (PF) (MARCAINE) 0.5 % injection   Intrathecal PRN Jolly Burly, APRN - CRNA   0.5 mL at 02/21/23 1112       Allergies:  No Known Allergies    Problem List:    Patient Active Problem List   Diagnosis Code    Breech presentation of fetus O32. 1XX0    Breech presentation of fetus palpable vaginally, single or unspecified fetus O32. 1XX0    Normal labor and delivery O80       Past Medical History:        Diagnosis Date    Glaucoma 2017    Dr. Bety Escobar PCOS (polycystic ovarian syndrome)     Dr. Jakub Tineo       Past Surgical History:        Procedure Laterality Date    CATARACT REMOVAL Left 1995       Social History:    Social History     Tobacco Use    Smoking status: Never    Smokeless tobacco: Never   Substance Use Topics    Alcohol use: Not Currently                                Counseling given: Not Answered      Vital Signs (Current):   Vitals: 02/21/23 1117 02/21/23 1121 02/21/23 1124 02/21/23 1130   BP: (!) 141/81 135/77 (!) 140/77 134/72   Pulse: 59 57 59 57   Resp:       Temp:       TempSrc:       SpO2:       Weight:       Height:                                                  BP Readings from Last 3 Encounters:   02/21/23 134/72   02/10/23 138/84   01/25/23 (!) 144/71       NPO Status:                                                                                 BMI:   Wt Readings from Last 3 Encounters:   02/20/23 184 lb (83.5 kg)   02/10/23 180 lb (81.6 kg)   01/25/23 178 lb (80.7 kg)     Body mass index is 29.7 kg/m². CBC:   Lab Results   Component Value Date/Time    WBC 12.1 02/20/2023 01:36 PM    RBC 4.41 02/20/2023 01:36 PM    HGB 15.0 02/20/2023 01:36 PM    HCT 42.7 02/20/2023 01:36 PM    MCV 96.8 02/20/2023 01:36 PM    RDW 12.5 02/20/2023 01:36 PM     02/20/2023 01:36 PM       CMP:   Lab Results   Component Value Date/Time     02/20/2023 01:36 PM    K 3.8 02/20/2023 01:36 PM     02/20/2023 01:36 PM    CO2 18 02/20/2023 01:36 PM    BUN 8 02/20/2023 01:36 PM    CREATININE 0.6 02/20/2023 01:36 PM    GFRAA >60 01/18/2022 07:52 AM    AGRATIO 1.2 02/20/2023 01:36 PM    LABGLOM >60 02/20/2023 01:36 PM    GLUCOSE 123 02/20/2023 01:36 PM    PROT 6.5 02/20/2023 01:36 PM    CALCIUM 8.8 02/20/2023 01:36 PM    BILITOT 0.3 02/20/2023 01:36 PM    ALKPHOS 156 02/20/2023 01:36 PM    AST 14 02/20/2023 01:36 PM    ALT 10 02/20/2023 01:36 PM       POC Tests: No results for input(s): POCGLU, POCNA, POCK, POCCL, POCBUN, POCHEMO, POCHCT in the last 72 hours.     Coags: No results found for: PROTIME, INR, APTT    HCG (If Applicable): No results found for: PREGTESTUR, PREGSERUM, HCG, HCGQUANT     ABGs: No results found for: PHART, PO2ART, VDC1WMI, LJF5BZS, BEART, C6SVADUL     Type & Screen (If Applicable):  No results found for: LABABO, LABRH    Drug/Infectious Status (If Applicable):  No results found for: HIV, HEPCAB    COVID-19 Screening (If Applicable): No results found for: COVID19        Anesthesia Evaluation    Airway: Mallampati: II     Neck ROM: full  Mouth opening: > = 3 FB   Dental:          Pulmonary:Negative Pulmonary ROS and normal exam                               Cardiovascular:  Exercise tolerance: good (>4 METS),                     Neuro/Psych:   Negative Neuro/Psych ROS              GI/Hepatic/Renal: Neg GI/Hepatic/Renal ROS            Endo/Other: Negative Endo/Other ROS                    Abdominal:             Vascular: negative vascular ROS.          Other Findings:           Anesthesia Plan      CSE     ASA 2                                   ISAAC Alarcon - CRNA   2/21/2023

## 2023-02-21 NOTE — H&P
Department of Obstetrics and Gynecology   Obstetrics History and Physical        CHIEF COMPLAINT:  elevated blood pressure    HISTORY OF PRESENT ILLNESS:      The patient is a 32 y.o. female at 44w3d. OB History          1    Para        Term                AB        Living             SAB        IAB        Ectopic        Molar        Multiple        Live Births                Patient presents with a chief complaint as above and is being admitted for induction    Estimated Due Date: Estimated Date of Delivery: 23    PRENATAL CARE:    Complicated by: S/P external cephalic version at 37 weeks. Was seen in office today and BP elevated at 160/88 and 154/82. Denies pre-eclampsia symptoms. Sent from office for IOL and pre-eclampsia evaluation. PAST OB HISTORY  OB History          1    Para        Term                AB        Living             SAB        IAB        Ectopic        Molar        Multiple        Live Births                    Past Medical History:        Diagnosis Date    Glaucoma     Dr. Karolina Malin    PCOS (polycystic ovarian syndrome)     Dr. Megan Braxton     Past Surgical History:        Procedure Laterality Date    CATARACT REMOVAL Left      Allergies:  Patient has no known allergies.   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[employer])   Tobacco Use    Smoking status: Never    Smokeless tobacco: Never   Vaping Use    Vaping Use: Never used   Substance and Sexual Activity    Alcohol use: Not Currently    Drug use: Not Currently    Sexual activity: Yes     Partners: Male   Other Topics Concern    Not on file   Social History Narrative    Not on file     Social Determinants of Health     Financial Resource Strain: Low Risk Difficulty of Paying Living Expenses: Not hard at all   Food Insecurity: No Food Insecurity    Worried About Running Out of Food in the Last Year: Never true    Ran Out of Food in the Last Year: Never true   Transportation Needs: Not on file   Physical Activity: Not on file   Stress: Not on file   Social Connections: Not on file   Intimate Partner Violence: Not on file   Housing Stability: Not on file     Family History:       Problem Relation Age of Onset    No Known Problems Mother     Heart Surgery Father         ASD repair 2017    Hypothyroidism Sister     No Known Problems Brother     No Known Problems Brother     Diabetes Maternal Grandfather     High Cholesterol Other      Medications Prior to Admission:  Medications Prior to Admission: Prenatal MV-Min-Fe Fum-FA-DHA (PRENATAL 1 PO), Take by mouth  cephALEXin (KEFLEX) 250 MG capsule, 1 capsule (Patient not taking: No sig reported)  Latanoprost-Timolol Maleate 0.005-0.5 % SOLN, Apply to eye (Patient not taking: No sig reported)  medroxyPROGESTERone (PROVERA) 10 MG tablet, Take 10 mg by mouth daily    REVIEW OF SYSTEMS:    CONSTITUTIONAL:  negative  RESPIRATORY:  negative  CARDIOVASCULAR:  negative  GASTROINTESTINAL:  negative  ALLERGIC/IMMUNOLOGIC:  negative  NEUROLOGICAL:  negative  BEHAVIOR/PSYCH:  negative    PHYSICAL EXAM:  Blood pressure 135/78, pulse 68, temperature 98.4 °F (36.9 °C), temperature source Oral, resp. rate 16, height 5' 6\" (1.676 m), weight 184 lb (83.5 kg), SpO2 100 %. General appearance:  awake, alert, cooperative, no apparent distress, and appears stated age  Neurologic:  Awake, alert, oriented to name, place and time. Lungs:  No increased work of breathing, good air exchange  Abdomen:  Soft, non tender, gravid, consistent with her gestational age, EFW by Leopald's manouever was 7.5#   Fetal heart rate:  Reassuring.   Pelvis:  Adequate pelvis  Cervix: 1 50% medium -3  Contraction frequency:  irregular, mild     Membranes: intact    Labs:    Latest Reference Range & Units 2/20/23 13:30 2/20/23 13:36   Sodium 136 - 145 mmol/L  133 (L)   Potassium 3.5 - 5.1 mmol/L  3.8   Chloride 99 - 110 mmol/L  102   CO2 21 - 32 mmol/L  18 (L)   BUN,BUNPL 7 - 20 mg/dL  8   Creatinine 0.6 - 1.1 mg/dL  0.6   Anion Gap 3 - 16   13   Est, Glom Filt Rate >60   >60   Protein/Creat Ratio mg/dL 0.1    Glucose, Random 70 - 99 mg/dL  123 (H)   CALCIUM, SERUM, 289482 8.3 - 10.6 mg/dL  8.8   Total Protein 6.4 - 8.2 g/dL  6.5   Uric Acid, Serum 2.6 - 6.0 mg/dL  6.1 (H)   Albumin 3.4 - 5.0 g/dL  3.6   Albumin/Globulin Ratio 1.1 - 2.2   1.2   Alk Phos 40 - 129 U/L  156 (H)   ALT 10 - 40 U/L  10   AST 15 - 37 U/L  14 (L)   BILIRUBIN TOTAL 0.0 - 1.0 mg/dL  0.3      Latest Reference Range & Units 2/20/23 13:36   WBC 4.0 - 11.0 K/uL 12.1 (H)   RBC 4.00 - 5.20 M/uL 4.41   Hemoglobin Quant 12.0 - 16.0 g/dL 15.0   Hematocrit 36.0 - 48.0 % 42.7   MCV 80.0 - 100.0 fL 96.8   MCH 26.0 - 34.0 pg 34.1 (H)   MCHC 31.0 - 36.0 g/dL 35.2   MPV 5.0 - 10.5 fL 9.4   RDW 12.4 - 15.4 % 12.5   Platelet Count 422 - 450 K/uL 164   Neutrophils % % 83.2   Lymphocyte % % 12.2   Monocytes % % 4.0   Eosinophils % % 0.3   Basophils % % 0.3   Neutrophils Absolute 1.7 - 7.7 K/uL 10.1 (H)   Lymphocytes Absolute 1.0 - 5.1 K/uL 1.5   Monocytes Absolute 0.0 - 1.3 K/uL 0.5   Eosinophils Absolute 0.0 - 0.6 K/uL 0.0   Basophils Absolute 0.0 - 0.2 K/uL 0.0      Latest Reference Range & Units 2/20/23 13:30   Creatinine, Ur 28.0 - 259.0 mg/dL 64.0   Protein, Ur <12 mg/dL 7.00   Protein/Creat Ratio mg/dL 0.1     ASSESSMENT AND PLAN:    Labor: Admit, anticipate normal delivery, routine labor orders  Fetus: Reassuring  GBS: No  Other: admit foriol due to GHTN at term. Plan Misprostol/Pitocin. Epidural prn.     Irina Galdamez MD 2/20/2023 8:49 PM

## 2023-02-21 NOTE — PROGRESS NOTES
Department of Obstetrics and Gynecology  Labor and Delivery   Attending Progress Note      SUBJECTIVE:  pt comfortable with epidural    OBJECTIVE:      Fetal heart rate:       Baseline Heart Rate:  140        Accelerations:  present       Long Term Variability:  moderate       Decelerations:  absent         Contraction frequency: 5 minutes    Membranes:  Ruptured meconium stained    Cervix:         Dilation:  4 cm         Effacement:  75%         Station:  -1         Position:  anterior             ASSESSMENT & PLAN:    Continue pitocin induction

## 2023-02-21 NOTE — ANESTHESIA PROCEDURE NOTES
CSE Block    Patient location during procedure: OB  Start time: 2/21/2023 11:06 AM  End time: 2/21/2023 11:13 AM  Reason for block: labor epidural  Staffing  Resident/CRNA: ISAAC Garcia - CRNA  CSE  Patient position: sitting  Prep: ChloraPrep  Patient monitoring: continuous pulse ox  Approach: midline  Provider prep: mask and sterile gloves  Spinal Needle  Needle type: short-bevel   Needle gauge: 25 G  Epidural Needle  Injection technique: KANE air  Needle type: Tuohy   Needle gauge: 17 G  Location: lumbar (1-5)  Catheter  Catheter type: end hole  AssessmentT6  Preanesthetic Checklist  Completed: patient identified, IV checked, site marked, risks and benefits discussed, surgical/procedural consents, equipment checked, pre-op evaluation, timeout performed, anesthesia consent given, oxygen available, monitors applied/VS acknowledged, fire risk safety assessment completed and verbalized and blood product R/B/A discussed and consented

## 2023-02-21 NOTE — PROGRESS NOTES
Pt met and chart reviewed    Department of Obstetrics and Gynecology  Labor and Delivery   Attending Progress Note      SUBJECTIVE:  pt with increasing contractions    OBJECTIVE:      Fetal heart rate:       Baseline Heart Rate:  140        Accelerations:  present       Long Term Variability:  moderate       Decelerations:  absent         Contraction frequency: 5 minutes    Membranes:  AROM performed for clear fluid    Cervix:         Dilation:  2 cm         Effacement:  75%         Station:  -1         Position:  anterior             ASSESSMENT & PLAN:    Continue pitocin induction

## 2023-02-22 PROCEDURE — 2500000003 HC RX 250 WO HCPCS: Performed by: NURSE ANESTHETIST, CERTIFIED REGISTERED

## 2023-02-22 PROCEDURE — 3700000000 HC ANESTHESIA ATTENDED CARE: Performed by: OBSTETRICS & GYNECOLOGY

## 2023-02-22 PROCEDURE — 6360000002 HC RX W HCPCS: Performed by: NURSE ANESTHETIST, CERTIFIED REGISTERED

## 2023-02-22 PROCEDURE — 1220000000 HC SEMI PRIVATE OB R&B

## 2023-02-22 PROCEDURE — 6360000002 HC RX W HCPCS: Performed by: OBSTETRICS & GYNECOLOGY

## 2023-02-22 PROCEDURE — 7100000001 HC PACU RECOVERY - ADDTL 15 MIN: Performed by: OBSTETRICS & GYNECOLOGY

## 2023-02-22 PROCEDURE — 3609079900 HC CESAREAN SECTION: Performed by: OBSTETRICS & GYNECOLOGY

## 2023-02-22 PROCEDURE — 7100000000 HC PACU RECOVERY - FIRST 15 MIN: Performed by: OBSTETRICS & GYNECOLOGY

## 2023-02-22 PROCEDURE — 2580000003 HC RX 258: Performed by: OBSTETRICS & GYNECOLOGY

## 2023-02-22 PROCEDURE — 2709999900 HC NON-CHARGEABLE SUPPLY: Performed by: OBSTETRICS & GYNECOLOGY

## 2023-02-22 PROCEDURE — 6370000000 HC RX 637 (ALT 250 FOR IP): Performed by: OBSTETRICS & GYNECOLOGY

## 2023-02-22 PROCEDURE — 3700000001 HC ADD 15 MINUTES (ANESTHESIA): Performed by: OBSTETRICS & GYNECOLOGY

## 2023-02-22 PROCEDURE — 88307 TISSUE EXAM BY PATHOLOGIST: CPT

## 2023-02-22 RX ORDER — ONDANSETRON 2 MG/ML
4 INJECTION INTRAMUSCULAR; INTRAVENOUS EVERY 6 HOURS PRN
Status: DISCONTINUED | OUTPATIENT
Start: 2023-02-22 | End: 2023-02-24 | Stop reason: HOSPADM

## 2023-02-22 RX ORDER — DIPHENHYDRAMINE HYDROCHLORIDE 50 MG/ML
25 INJECTION INTRAMUSCULAR; INTRAVENOUS EVERY 6 HOURS PRN
Status: DISCONTINUED | OUTPATIENT
Start: 2023-02-22 | End: 2023-02-24 | Stop reason: HOSPADM

## 2023-02-22 RX ORDER — OXYCODONE HYDROCHLORIDE 5 MG/1
10 TABLET ORAL EVERY 4 HOURS PRN
Status: DISCONTINUED | OUTPATIENT
Start: 2023-02-22 | End: 2023-02-24 | Stop reason: HOSPADM

## 2023-02-22 RX ORDER — SODIUM CHLORIDE 0.9 % (FLUSH) 0.9 %
5-40 SYRINGE (ML) INJECTION EVERY 12 HOURS SCHEDULED
Status: DISCONTINUED | OUTPATIENT
Start: 2023-02-22 | End: 2023-02-24 | Stop reason: HOSPADM

## 2023-02-22 RX ORDER — DOCUSATE SODIUM 100 MG/1
100 CAPSULE, LIQUID FILLED ORAL 2 TIMES DAILY PRN
Status: DISCONTINUED | OUTPATIENT
Start: 2023-02-22 | End: 2023-02-24 | Stop reason: HOSPADM

## 2023-02-22 RX ORDER — SODIUM CHLORIDE, SODIUM LACTATE, POTASSIUM CHLORIDE, CALCIUM CHLORIDE 600; 310; 30; 20 MG/100ML; MG/100ML; MG/100ML; MG/100ML
INJECTION, SOLUTION INTRAVENOUS CONTINUOUS
Status: DISCONTINUED | OUTPATIENT
Start: 2023-02-22 | End: 2023-02-24 | Stop reason: HOSPADM

## 2023-02-22 RX ORDER — KETOROLAC TROMETHAMINE 30 MG/ML
30 INJECTION, SOLUTION INTRAMUSCULAR; INTRAVENOUS EVERY 6 HOURS PRN
Status: DISCONTINUED | OUTPATIENT
Start: 2023-02-22 | End: 2023-02-24 | Stop reason: HOSPADM

## 2023-02-22 RX ORDER — OXYCODONE HYDROCHLORIDE 5 MG/1
5 TABLET ORAL EVERY 4 HOURS PRN
Status: DISCONTINUED | OUTPATIENT
Start: 2023-02-22 | End: 2023-02-24 | Stop reason: HOSPADM

## 2023-02-22 RX ORDER — SIMETHICONE 80 MG
80 TABLET,CHEWABLE ORAL EVERY 6 HOURS PRN
Status: DISCONTINUED | OUTPATIENT
Start: 2023-02-22 | End: 2023-02-24 | Stop reason: HOSPADM

## 2023-02-22 RX ORDER — FERROUS SULFATE 325(65) MG
325 TABLET ORAL 2 TIMES DAILY WITH MEALS
Status: DISCONTINUED | OUTPATIENT
Start: 2023-02-22 | End: 2023-02-24 | Stop reason: HOSPADM

## 2023-02-22 RX ORDER — OXYTOCIN 10 [USP'U]/ML
INJECTION, SOLUTION INTRAMUSCULAR; INTRAVENOUS PRN
Status: DISCONTINUED | OUTPATIENT
Start: 2023-02-22 | End: 2023-02-22 | Stop reason: SDUPTHER

## 2023-02-22 RX ORDER — LIDOCAINE HYDROCHLORIDE AND EPINEPHRINE BITARTRATE 20; .01 MG/ML; MG/ML
INJECTION, SOLUTION SUBCUTANEOUS PRN
Status: DISCONTINUED | OUTPATIENT
Start: 2023-02-22 | End: 2023-02-22 | Stop reason: SDUPTHER

## 2023-02-22 RX ORDER — MORPHINE SULFATE 0.5 MG/ML
INJECTION, SOLUTION EPIDURAL; INTRATHECAL; INTRAVENOUS PRN
Status: DISCONTINUED | OUTPATIENT
Start: 2023-02-22 | End: 2023-02-22 | Stop reason: SDUPTHER

## 2023-02-22 RX ORDER — PRENATAL WITH FERROUS FUM AND FOLIC ACID 3080; 920; 120; 400; 22; 1.84; 3; 20; 10; 1; 12; 200; 27; 25; 2 [IU]/1; [IU]/1; MG/1; [IU]/1; MG/1; MG/1; MG/1; MG/1; MG/1; MG/1; UG/1; MG/1; MG/1; MG/1; MG/1
1 TABLET ORAL DAILY
Status: DISCONTINUED | OUTPATIENT
Start: 2023-02-22 | End: 2023-02-24 | Stop reason: HOSPADM

## 2023-02-22 RX ORDER — ONDANSETRON 2 MG/ML
INJECTION INTRAMUSCULAR; INTRAVENOUS PRN
Status: DISCONTINUED | OUTPATIENT
Start: 2023-02-22 | End: 2023-02-22 | Stop reason: SDUPTHER

## 2023-02-22 RX ORDER — DEXMEDETOMIDINE HYDROCHLORIDE 100 UG/ML
INJECTION, SOLUTION INTRAVENOUS PRN
Status: DISCONTINUED | OUTPATIENT
Start: 2023-02-22 | End: 2023-02-22 | Stop reason: SDUPTHER

## 2023-02-22 RX ORDER — FAMOTIDINE 20 MG/1
20 TABLET, FILM COATED ORAL DAILY PRN
Status: DISCONTINUED | OUTPATIENT
Start: 2023-02-22 | End: 2023-02-24 | Stop reason: HOSPADM

## 2023-02-22 RX ORDER — SODIUM CHLORIDE 0.9 % (FLUSH) 0.9 %
5-40 SYRINGE (ML) INJECTION PRN
Status: DISCONTINUED | OUTPATIENT
Start: 2023-02-22 | End: 2023-02-24 | Stop reason: HOSPADM

## 2023-02-22 RX ORDER — LANOLIN 100 %
OINTMENT (GRAM) TOPICAL
Status: DISCONTINUED | OUTPATIENT
Start: 2023-02-22 | End: 2023-02-24 | Stop reason: HOSPADM

## 2023-02-22 RX ORDER — FAMOTIDINE 10 MG/ML
INJECTION, SOLUTION INTRAVENOUS PRN
Status: DISCONTINUED | OUTPATIENT
Start: 2023-02-22 | End: 2023-02-22 | Stop reason: SDUPTHER

## 2023-02-22 RX ORDER — ACETAMINOPHEN 500 MG
1000 TABLET ORAL EVERY 8 HOURS SCHEDULED
Status: DISCONTINUED | OUTPATIENT
Start: 2023-02-22 | End: 2023-02-24 | Stop reason: HOSPADM

## 2023-02-22 RX ORDER — SODIUM CHLORIDE 9 MG/ML
INJECTION, SOLUTION INTRAVENOUS PRN
Status: DISCONTINUED | OUTPATIENT
Start: 2023-02-22 | End: 2023-02-24 | Stop reason: HOSPADM

## 2023-02-22 RX ORDER — AZITHROMYCIN 500 MG/1
INJECTION, POWDER, LYOPHILIZED, FOR SOLUTION INTRAVENOUS
Status: DISCONTINUED
Start: 2023-02-22 | End: 2023-02-22

## 2023-02-22 RX ADMIN — Medication 15 ML: at 03:43

## 2023-02-22 RX ADMIN — FAMOTIDINE 20 MG: 10 INJECTION, SOLUTION INTRAVENOUS at 13:40

## 2023-02-22 RX ADMIN — OXYTOCIN 10 UNITS: 10 INJECTION INTRAVENOUS at 14:27

## 2023-02-22 RX ADMIN — ONDANSETRON 4 MG: 2 INJECTION INTRAMUSCULAR; INTRAVENOUS at 14:50

## 2023-02-22 RX ADMIN — ONDANSETRON 4 MG: 2 INJECTION INTRAMUSCULAR; INTRAVENOUS at 13:40

## 2023-02-22 RX ADMIN — SODIUM CHLORIDE, POTASSIUM CHLORIDE, SODIUM LACTATE AND CALCIUM CHLORIDE: 600; 310; 30; 20 INJECTION, SOLUTION INTRAVENOUS at 14:27

## 2023-02-22 RX ADMIN — SODIUM CHLORIDE, POTASSIUM CHLORIDE, SODIUM LACTATE AND CALCIUM CHLORIDE: 600; 310; 30; 20 INJECTION, SOLUTION INTRAVENOUS at 04:38

## 2023-02-22 RX ADMIN — BUPIVACAINE HYDROCHLORIDE 8 ML: 5 INJECTION, SOLUTION EPIDURAL; INTRACAUDAL; PERINEURAL at 00:00

## 2023-02-22 RX ADMIN — BUPIVACAINE HYDROCHLORIDE 8 ML: 5 INJECTION, SOLUTION EPIDURAL; INTRACAUDAL; PERINEURAL at 10:57

## 2023-02-22 RX ADMIN — DOCUSATE SODIUM 100 MG: 100 CAPSULE, LIQUID FILLED ORAL at 21:36

## 2023-02-22 RX ADMIN — Medication 1 MILLI-UNITS/MIN: at 07:28

## 2023-02-22 RX ADMIN — LIDOCAINE HYDROCHLORIDE AND EPINEPHRINE 10 ML: 20; 10 INJECTION, SOLUTION INFILTRATION; PERINEURAL at 13:32

## 2023-02-22 RX ADMIN — SODIUM CHLORIDE, POTASSIUM CHLORIDE, SODIUM LACTATE AND CALCIUM CHLORIDE: 600; 310; 30; 20 INJECTION, SOLUTION INTRAVENOUS at 13:45

## 2023-02-22 RX ADMIN — ACETAMINOPHEN 1000 MG: 500 TABLET ORAL at 16:54

## 2023-02-22 RX ADMIN — AZITHROMYCIN MONOHYDRATE 500 MG: 500 INJECTION, POWDER, LYOPHILIZED, FOR SOLUTION INTRAVENOUS at 13:45

## 2023-02-22 RX ADMIN — KETOROLAC TROMETHAMINE 30 MG: 30 INJECTION, SOLUTION INTRAMUSCULAR; INTRAVENOUS at 18:31

## 2023-02-22 RX ADMIN — OXYTOCIN 20 UNITS: 10 INJECTION INTRAVENOUS at 14:02

## 2023-02-22 RX ADMIN — MORPHINE SULFATE 2.5 MG: 0.5 INJECTION EPIDURAL; INTRATHECAL; INTRAVENOUS at 14:05

## 2023-02-22 RX ADMIN — SODIUM CHLORIDE, POTASSIUM CHLORIDE, SODIUM LACTATE AND CALCIUM CHLORIDE: 600; 310; 30; 20 INJECTION, SOLUTION INTRAVENOUS at 21:36

## 2023-02-22 RX ADMIN — DEXMEDETOMIDINE 20 MCG: 100 INJECTION, SOLUTION INTRAVENOUS at 10:57

## 2023-02-22 RX ADMIN — SODIUM CHLORIDE, POTASSIUM CHLORIDE, SODIUM LACTATE AND CALCIUM CHLORIDE: 600; 310; 30; 20 INJECTION, SOLUTION INTRAVENOUS at 12:08

## 2023-02-22 RX ADMIN — LIDOCAINE HYDROCHLORIDE AND EPINEPHRINE 5 ML: 20; 10 INJECTION, SOLUTION INFILTRATION; PERINEURAL at 13:50

## 2023-02-22 ASSESSMENT — PAIN DESCRIPTION - DESCRIPTORS: DESCRIPTORS: DULL

## 2023-02-22 ASSESSMENT — PAIN DESCRIPTION - LOCATION
LOCATION: ABDOMEN
LOCATION: ABDOMEN

## 2023-02-22 ASSESSMENT — PAIN SCALES - GENERAL
PAINLEVEL_OUTOF10: 5
PAINLEVEL_OUTOF10: 6

## 2023-02-22 NOTE — PROGRESS NOTES
Chart reviewed and pt examined  Comfortable with epidural  Cvx 8/C/+1  FHR tracing reassuring  Ctxns q 2 mins on 4 mIU/min PItocin    PLAN:  Continue labor    ZAYRA Castro MD

## 2023-02-22 NOTE — PROGRESS NOTES
Pt remains comfortable with epidural  Cvx remains 8/C/+1  FHR tracing reassuring  Contractions regular  Discussed FTP, recommend C/S at this time. R/B/A/C/SE reviewed and questions answered. Agrees to proceed. Ancef/azithromycin for surgical prophylaxis, vaginal prep.     Talat Hicks MD

## 2023-02-22 NOTE — PROGRESS NOTES
Department of Obstetrics and Gynecology  Labor and Delivery   Attending Progress Note      SUBJECTIVE:  comfortable with epidural    OBJECTIVE:      Fetal heart rate:       Baseline Heart Rate:  140        Accelerations:  present       Long Term Variability:  moderate       Decelerations:  absent   occasional late decel not present currently     Contraction frequency: 7 minutes    Membranes:  Ruptured clear fluid    Cervix:         Dilation:  5 cm         Effacement:  75%         Station:  -1         Position:  anterior             ASSESSMENT & PLAN:    Continue pitocin induction

## 2023-02-22 NOTE — BRIEF OP NOTE
Department of Obstetrics and Gynecology  Obstetrical Brief Operative Report        Pre-operative Diagnosis:  IUP at 40w2d, FTP after IOL for GHTN at term    Post-operative Diagnosis:  same, delivered    Procedure:  primary low transverse  section    Surgeon:  Alisha Redding MD      Assistant(s):  TERESA Whitehead    Anesthesia:  Epidural anesthesia    Findings:      Live Born  Sex:  Female  Fetal Position:  Cephalic, occiput posterior  Apgars:  1 minute:  9; 5 minute:  9  Weight:  7#11oz  Tubes, uterus, ovaries:  normal    Estimated blood loss:  600ml; QBL intra-operatively = 604 mL    Blood Transfusion?:  No     Specimens:  placenta sent to pathology    Complications:  none    Condition:    Infant stable, transfer to [place] [id number] Nurs[facility]  Mother stable, transfer to labor & delivery room      See dictated operative report for full details.

## 2023-02-22 NOTE — PROGRESS NOTES
Call from Dr. Romaroi Morfin to check on patient. I informed him of pt SVE of 3-4/80/-1 and that her pitocin was at 16.  No new orders received

## 2023-02-22 NOTE — PLAN OF CARE
Problem: Vaginal Birth or  Section  Goal: Fetal and maternal status remain reassuring during the birth process  Description:  Birth OB-Pregnancy care plan goal which identifies if the fetal and maternal status remain reassuring during the birth process  Outcome: Progressing     Problem: Pain  Goal: Verbalizes/displays adequate comfort level or baseline comfort level  Outcome: Progressing     Problem: Infection - Adult  Goal: Absence of infection at discharge  Outcome: Progressing     Problem: Safety - Adult  Goal: Free from fall injury  Outcome: Progressing     Problem: Skin/Tissue Integrity  Goal: Absence of new skin breakdown  Description: 1. Monitor for areas of redness and/or skin breakdown  2. Assess vascular access sites hourly  3. Every 4-6 hours minimum:  Change oxygen saturation probe site  4. Every 4-6 hours:  If on nasal continuous positive airway pressure, respiratory therapy assess nares and determine need for appliance change or resting period.   Outcome: Progressing

## 2023-02-22 NOTE — PLAN OF CARE
Problem: Vaginal Birth or  Section  Goal: Fetal and maternal status remain reassuring during the birth process  Description:  Birth OB-Pregnancy care plan goal which identifies if the fetal and maternal status remain reassuring during the birth process  2023 by Saul Ureña RN  Outcome: Progressing     Problem: Pain  Goal: Verbalizes/displays adequate comfort level or baseline comfort level  2023 by Saul Ureña RN  Outcome: Progressing     Problem: Infection - Adult  Goal: Absence of infection at discharge  2023 by Saul Ureña RN  Outcome: Progressing     Problem: Infection - Adult  Goal: Absence of infection during hospitalization  Outcome: Progressing     Problem: Infection - Adult  Goal: Absence of fever/infection during anticipated neutropenic period  Outcome: Progressing     Problem: Safety - Adult  Goal: Free from fall injury  2023 by Saul Ureña RN  Outcome: Progressing     Problem: Discharge Planning  Goal: Discharge to home or other facility with appropriate resources  Outcome: Progressing     Problem: Chronic Conditions and Co-morbidities  Goal: Patient's chronic conditions and co-morbidity symptoms are monitored and maintained or improved  Outcome: Progressing

## 2023-02-22 NOTE — PROGRESS NOTES
Call to Dr. Francisca Roach MD to review tracing with me. She recommended that I check patient and maybe try scalp stim to see if baby reacts to it. She said to continue to monitor before starting pitocin. I will check patient and call provider to update.

## 2023-02-22 NOTE — PROGRESS NOTES
Call to Dr. Herbert Doran to inform him that I turned off patient's pitocin because baby was having runs of late decels and minimal variability.  He said he was on his way in at this time and would see her when he got here

## 2023-02-22 NOTE — PLAN OF CARE
Problem: Postpartum  Goal: Experiences normal postpartum course  Description:  Postpartum OB-Pregnancy care plan goal which identifies if the mother is experiencing a normal postpartum course  Outcome: Progressing  Goal: Appropriate maternal -  bonding  Description:  Postpartum OB-Pregnancy care plan goal which identifies if the mother and  are bonding appropriately  Outcome: Progressing  Goal: Establishment of infant feeding pattern  Description:  Postpartum OB-Pregnancy care plan goal which identifies if the mother is establishing a feeding pattern with their   Outcome: Progressing  Goal: Incisions, wounds, or drain sites healing without S/S of infection  Outcome: Progressing     Problem: Pain  Goal: Verbalizes/displays adequate comfort level or baseline comfort level  2023 1608 by Rene Oates RN  Outcome: Progressing  2023 0741 by Rene Oates RN  Outcome: Progressing  2023 07 by Namita Bonilla RN  Outcome: Progressing     Problem: Infection - Adult  Goal: Absence of infection at discharge  2023 1608 by Rene Oates RN  Outcome: Progressing  2023 0741 by Rene Oates RN  Outcome: Progressing  2023 0718 by Namita Bonilla RN  Outcome: Progressing  Goal: Absence of infection during hospitalization  2023 1608 by Rene Oates RN  Outcome: Progressing  2023 0741 by Rene Oates RN  Outcome: Progressing  Goal: Absence of fever/infection during anticipated neutropenic period  2023 1608 by Rene Oates RN  Outcome: Progressing  2023 0741 by Rene Oates RN  Outcome: Progressing     Problem: Safety - Adult  Goal: Free from fall injury  2023 1608 by Rene Oates RN  Outcome: Progressing  2023 0741 by Rene Oates RN  Outcome: Progressing  2023 0718 by Namita Bonilla RN  Outcome: Progressing     Problem: Discharge Planning  Goal: Discharge to home or other facility with appropriate resources  2/22/2023 1608 by Tito Alexander RN  Outcome: Progressing  2/22/2023 0741 by Tito Alexander RN  Outcome: Progressing     Problem: Chronic Conditions and Co-morbidities  Goal: Patient's chronic conditions and co-morbidity symptoms are monitored and maintained or improved  2/22/2023 1608 by Tito Alexander RN  Outcome: Progressing  2/22/2023 0741 by Tito Alexander RN  Outcome: Progressing     Problem: Skin/Tissue Integrity  Goal: Absence of new skin breakdown  Description: 1. Monitor for areas of redness and/or skin breakdown  2. Assess vascular access sites hourly  3. Every 4-6 hours minimum:  Change oxygen saturation probe site  4. Every 4-6 hours:  If on nasal continuous positive airway pressure, respiratory therapy assess nares and determine need for appliance change or resting period.   2/22/2023 1608 by Tito Alexander RN  Outcome: Progressing  2/22/2023 0718 by Roni Botello RN  Outcome: Progressing

## 2023-02-23 LAB
HCT VFR BLD CALC: 36.3 % (ref 36–48)
HEMOGLOBIN: 12.8 G/DL (ref 12–16)
MCH RBC QN AUTO: 34.5 PG (ref 26–34)
MCHC RBC AUTO-ENTMCNC: 35.1 G/DL (ref 31–36)
MCV RBC AUTO: 98.2 FL (ref 80–100)
PDW BLD-RTO: 12.8 % (ref 12.4–15.4)
PLATELET # BLD: 137 K/UL (ref 135–450)
PMV BLD AUTO: 9.4 FL (ref 5–10.5)
RBC # BLD: 3.7 M/UL (ref 4–5.2)
WBC # BLD: 17.9 K/UL (ref 4–11)

## 2023-02-23 PROCEDURE — 2580000003 HC RX 258: Performed by: OBSTETRICS & GYNECOLOGY

## 2023-02-23 PROCEDURE — 85027 COMPLETE CBC AUTOMATED: CPT

## 2023-02-23 PROCEDURE — 6370000000 HC RX 637 (ALT 250 FOR IP): Performed by: OBSTETRICS & GYNECOLOGY

## 2023-02-23 PROCEDURE — 1220000000 HC SEMI PRIVATE OB R&B

## 2023-02-23 PROCEDURE — 6370000000 HC RX 637 (ALT 250 FOR IP): Performed by: STUDENT IN AN ORGANIZED HEALTH CARE EDUCATION/TRAINING PROGRAM

## 2023-02-23 PROCEDURE — 36415 COLL VENOUS BLD VENIPUNCTURE: CPT

## 2023-02-23 PROCEDURE — 6360000002 HC RX W HCPCS: Performed by: OBSTETRICS & GYNECOLOGY

## 2023-02-23 RX ORDER — IBUPROFEN 800 MG/1
800 TABLET ORAL EVERY 6 HOURS PRN
Status: DISCONTINUED | OUTPATIENT
Start: 2023-02-23 | End: 2023-02-24 | Stop reason: HOSPADM

## 2023-02-23 RX ADMIN — ACETAMINOPHEN 1000 MG: 500 TABLET ORAL at 01:07

## 2023-02-23 RX ADMIN — KETOROLAC TROMETHAMINE 30 MG: 30 INJECTION, SOLUTION INTRAMUSCULAR; INTRAVENOUS at 12:05

## 2023-02-23 RX ADMIN — ACETAMINOPHEN 1000 MG: 500 TABLET ORAL at 19:51

## 2023-02-23 RX ADMIN — IBUPROFEN 800 MG: 800 TABLET, FILM COATED ORAL at 22:08

## 2023-02-23 RX ADMIN — DOCUSATE SODIUM 100 MG: 100 CAPSULE, LIQUID FILLED ORAL at 08:20

## 2023-02-23 RX ADMIN — SODIUM CHLORIDE, PRESERVATIVE FREE 10 ML: 5 INJECTION INTRAVENOUS at 08:20

## 2023-02-23 RX ADMIN — DOCUSATE SODIUM 100 MG: 100 CAPSULE, LIQUID FILLED ORAL at 19:51

## 2023-02-23 RX ADMIN — METFORMIN HYDROCHLORIDE 1 TABLET: 500 TABLET, EXTENDED RELEASE ORAL at 08:19

## 2023-02-23 ASSESSMENT — PAIN DESCRIPTION - ORIENTATION: ORIENTATION: LOWER

## 2023-02-23 ASSESSMENT — PAIN - FUNCTIONAL ASSESSMENT: PAIN_FUNCTIONAL_ASSESSMENT: ACTIVITIES ARE NOT PREVENTED

## 2023-02-23 ASSESSMENT — PAIN SCALES - GENERAL
PAINLEVEL_OUTOF10: 5
PAINLEVEL_OUTOF10: 6
PAINLEVEL_OUTOF10: 5
PAINLEVEL_OUTOF10: 1

## 2023-02-23 ASSESSMENT — PAIN DESCRIPTION - LOCATION
LOCATION: ABDOMEN;INCISION
LOCATION: INCISION
LOCATION: ABDOMEN

## 2023-02-23 ASSESSMENT — PAIN DESCRIPTION - DESCRIPTORS: DESCRIPTORS: DISCOMFORT

## 2023-02-23 NOTE — LACTATION NOTE
This note was copied from a baby's chart. Lactation Progress Note      Data:    Follow up consult on DOS for primip with an infant born at 40.2 weeks gestation. RN calling for consult for first feed. MOB has a history of PCOS. MOB calling for assistance getting infant to stay latched. Upon entry to the room, MOB & RN had infant in football position at left breast with poor positioning. Action:    Suggested we improve positioning by adjusting pillow so infant in next to mothers breast with ear, shoulder, and hip in alignment and with infant turned into mother. MOB was able to get infant latched but baby would only do a couple suck bursts then come off the breast. Suggested MOB hand express to entice infant. MOB able to easily hand express several large drops. Infant was still on & off the breast. Examined infants mouth, no ties noted. Infant appears to have a slightly recessed chin & tucks chin & bottom lip in when latching preventing baby from maintaining latch. Educated MOB about digital suck training and suggested the do it several times a day to improve infant coordination. Educated MOB how to hand express into a cup & feed back to infant via syringe while sucking on a gloved finger. MOB expressed just over 2.5 ml. Demonstrated for parents how to feed expressed colostrum back to infant. Advised MOB to do this after each feeding or feeding attempt to help bring in a good milk supply (MOB with PCOS). All questions answered & MOB encouraged to call for lactation support as needed. Response:    MOB verbalized an understanding of education provided and will call for assistance as needed.

## 2023-02-23 NOTE — LACTATION NOTE
This note was copied from a baby's chart. Lactation Progress Note      Data:     F/u during lactation rounds with primip breast feeder, 1 po. Delivered by C/S at 40.2 weeks gestation. MOB reports baby latches easily and well to the right breast, but struggles with latching and positioning on the left breast.     Action: Introduced self as LC on for this evening and offered support. Reviewed importance of FAHAD with feedings and gave tips to achieve FAHAD and positioning for both breasts. Encouraged to call for [facility] to assess the latch with her next feeding and as needed on the left breast. Reviewed how a good latch should look and feel and how to break the suction of the latch as needed if ever experiencing discomfort. Reviewed what to expect with breast feeding over the next 24-48 hours including breast care, how milk production works and types of milk mother will produce, educated on signs of hunger, satiety,  and expected  feeding behaviors, as well as reassuring signs that baby is getting enough at the breast including daily goals for infant feedings, output, and weight trends. Encouraged to offer the breast when infant first begins to wake and show early hunger cues, and every 2-3 hours if baby is sleepy and without feeding cues. Gave tips to wake sleepy baby as needed, and encouraged much hand expression and STS contact with attempts to offer the breast. Reviewed what to expect with cluster feeding behaviors, and the important role they play on bringing in and establishing a good milk supply. Instructed that baby should have a minimum of 8-12 good feedings in a 24 hour period after the first DOL. Encouraged exclusive breast feeding, educating on benefits, and how milk production works. Encouraged until latching and milk supply are well established to ideally around 4 weeks pp before introducing pacifiers, pumping, or offering bottles of EBM unless medical indication were to arise sooner.  Name and number provided on whiteboard. Encouraged to call for [facility] to assess latch and for f/u support prn. Response: Verbalized understanding of teaching provided. Will call for f/u support prn. 09-Apr-2021 12:50

## 2023-02-23 NOTE — LACTATION NOTE
This note was copied from a baby's chart. Lactation Progress Note      Data:   RN requests f/u support with latching. Mother offering the right breast on consult. Right nipple appears red, and mother c/o soreness. Action: Reviewed tips for good positioning and support of infant to the breast and breast support to encourage deep latch. Encouraged belly to belly and nose to nipple positioning, and encouraged to wait for infant to open mouth wide before latching. FAHAD achieved but after a few sucks infant pulled back and shallowed the latch. Shown to mother and shown how to break the suction. Infant with few more attempts to latch on, able to achieve FAHAD easily but again after a few sucks the baby pulled back and shallowed latch. On the 3rd or 4th attempt, infant was able to achieve and sustain FAHAD with the feeding. Mother c/o soreness to nipples. Latch appears deep with wide open mouth, lips flanged outward, chin indenting the breast, cheeks touching the breast and rounded, and nose lightly touching the breast. After a few minutes baby released from the breast and nipple was rounded without creasing, or blanching. Shown to mother. Explained how sore nipples may feel with deep latch vs shallow latching. Encouraged to watch for signs of FAHAD and explained that it is reassuring that nipple is rounded with release from the breast. Encouraged to notice nipple shape when baby releases from the breast. Oral assessment done with gloved finger. Infant with appropriate suck noted, and appears to have good mobility of the tongue including good elevation, extension and lateralization. Assisted back to the breast, infant rooting with wide open mouth FAHAD achieved with SRS and much AS heard throughout the feeding. Shown to mother reassuring of signs of FAHAD and milk transfer at the breast. Lanolin and hydrogel pads provided for comfort care to sore nipples.  Educated on how to use, and reiterated importance of continued FAHAD with feedings to promote healing to nipples. Breast feeding education reviewed. Observed the first 15 minutes of feeding, infant continues nursing well. Name and number remain on whiteboard. Encouraged to call for [facility] to assess the latch on the left breast and for f/u support prn. Response: Verbalized understanding of teaching provided. Pleased with deeper latch achieved and hearing infant with much swallowing during feeding. Will call for f/u support prn.

## 2023-02-23 NOTE — PLAN OF CARE
Problem: Postpartum  Goal: Experiences normal postpartum course  Description:  Postpartum OB-Pregnancy care plan goal which identifies if the mother is experiencing a normal postpartum course  2023 0322 by Waldemar Gaines RN  Outcome: Progressing  2023 1608 by Marcell Cogan, RN  Outcome: Progressing  Goal: Appropriate maternal -  bonding  Description:  Postpartum OB-Pregnancy care plan goal which identifies if the mother and  are bonding appropriately  2023 0322 by Waldemar Gaines RN  Outcome: Progressing  2023 1608 by Marcell Cogan, RN  Outcome: Progressing  Goal: Establishment of infant feeding pattern  Description:  Postpartum OB-Pregnancy care plan goal which identifies if the mother is establishing a feeding pattern with their   2023 6303 by Waldemar Gaines RN  Outcome: Progressing  2023 1608 by Marcell Cogan, RN  Outcome: Progressing  Goal: Incisions, wounds, or drain sites healing without S/S of infection  2023 0322 by Waldemar Gaines RN  Outcome: Progressing  2023 1608 by Marcell Cogan, RN  Outcome: Progressing     Problem: Pain  Goal: Verbalizes/displays adequate comfort level or baseline comfort level  2023 0322 by Waldemar Gaines RN  Outcome: Progressing  2023 1608 by Marcell Cogan, RN  Outcome: Progressing     Problem: Infection - Adult  Goal: Absence of infection at discharge  2023 0322 by Waldemar Gaines RN  Outcome: Progressing  2023 1608 by Marcell Cogan, RN  Outcome: Progressing  Goal: Absence of infection during hospitalization  2023 0322 by Waldemar Gainse RN  Outcome: Progressing  2023 1608 by Marcell Cogan, RN  Outcome: Progressing  Goal: Absence of fever/infection during anticipated neutropenic period  2023 0322 by Waldemar Gaines RN  Outcome: Progressing  2023 1608 by Marcell Cogan, RN  Outcome: Progressing     Problem: Safety - Adult  Goal: Free from fall injury  2/23/2023 3675 by Jignesh Peacock RN  Outcome: Progressing  2/22/2023 1608 by Yoel Ball RN  Outcome: Progressing     Problem: Discharge Planning  Goal: Discharge to home or other facility with appropriate resources  2/23/2023 0322 by Jignesh Peacock RN  Outcome: Progressing  2/22/2023 1608 by Yoel Ball RN  Outcome: Progressing     Problem: Chronic Conditions and Co-morbidities  Goal: Patient's chronic conditions and co-morbidity symptoms are monitored and maintained or improved  2/23/2023 0322 by Jignesh Peacock RN  Outcome: Progressing  2/22/2023 1608 by Yoel Ball RN  Outcome: Progressing     Problem: Skin/Tissue Integrity  Goal: Absence of new skin breakdown  Description: 1. Monitor for areas of redness and/or skin breakdown  2. Assess vascular access sites hourly  3. Every 4-6 hours minimum:  Change oxygen saturation probe site  4. Every 4-6 hours:  If on nasal continuous positive airway pressure, respiratory therapy assess nares and determine need for appliance change or resting period.   2/23/2023 0322 by Jignesh Peacock RN  Outcome: Progressing  2/22/2023 1608 by Yoel aBll RN  Outcome: Progressing

## 2023-02-23 NOTE — PROGRESS NOTES
Removed vasquez cath for 1200 cc clear yellow urine. Saline locked IV. Encouraged pt to continue to increase fluids. Standby assist for help to the bathroom. Gait steady. Pt tolerated well. Informed pt that she needs to void within 6 hours and urine  will be measured . Pt verbalized  understanding.

## 2023-02-23 NOTE — OP NOTE
[address], [place] [address]                                OPERATIVE REPORT    PATIENT NAME: Jodie Young                        :        1995  MED REC NO:   [id number]                          ROOM:       2612  ACCOUNT NO:   [de-identified]                           ADMIT DATE: 2023  PROVIDER:     Uvaldo Sauceda MD    DATE OF PROCEDURE:  2023    PROCEDURE:  Primary low-transverse  section. PREOPERATIVE DIAGNOSIS:  Intrauterine pregnancy at 40 weeks and 2 days,  failure to progress after induction of labor for gestational  hypertension at term. POSTOPERATIVE DIAGNOSIS:  Intrauterine pregnancy at 40 weeks and 2 days,  failure to progress after induction of labor for gestational  hypertension at term, delivered. SURGEON:  Uvaldo Sauceda MD    ANESTHESIA:  Epidural.    INDICATIONS AND CONSENT FOR PROCEDURE:  The patient is a 66-year-old   1, para 0 who presented at 40 weeks and 1 day from the office  after having elevated blood pressures there. She had no preeclampsia  symptoms. Blood pressures in the office were 161/88 and 154/82. She  had laboratory studies done and these were not consistent with  preeclampsia. She was diagnosed with gestational hypertension at term  and due to her gestational age delivery was indicated. She received  three doses of misoprostol and then was started on a Pitocin infusion in  the afternoon of 2023. She had artificial rupture of membranes,  meconium-stained amniotic fluid. She became uncomfortable. She  requested and received an epidural for labor analgesia. She made slow  progress and on the morning of 2023, she was noted to be 8 cm  dilated, completely effaced and +1 station with a reassuring fetal heart  rate tracing. She was having contractions every 2 minutes on 4  milliunits per minute of Pitocin. Labor was continued.   She was  rechecked at approximately 4 hours later and had made no change and was  having regular contractions on Pitocin. At this point, the failure to  progress was diagnosed and  section was recommended to the  patient for delivery. She agreed to proceed. She was aware of risks of  surgery including anesthesia, infection, injury to bowel or urinary  tract, hemorrhage requiring transfusion with risk of HIV or hepatitis  and possible need for further surgery. She understood these risks and  signed an informed consent. DESCRIPTION OF PROCEDURE:  With the patient under satisfactory epidural  anesthesia, she was placed in the supine position with a left lateral  tilt. A Novak catheter had been placed. The abdomen, vagina and  perineum were prepped and draped in usual sterile fashion. She did  receive Ancef 2 gm and azithromycin 500 mg as preoperative prophylaxis. A Pfannenstiel incision was made and this was carried down to the level  of fascia with the Bovie. The fascia was scored with a scalpel and the  fascial incisions extended laterally with the Mclean scissors. Archana Iglesia  clamps were placed on the inferior margin of the rectus fascia and using  blunt sharp dissection, the underlying rectus muscles were   away. This was done in a similar fashion superiorly. The rectus  muscles were divided in the midline and the peritoneum was then entered  sharply. The peritoneal incision was extended bluntly. The bladder  blade was placed. The vesicouterine reflection of the parietal  peritoneum was then taken down sharply. A transverse incision was made  in lower uterine segment. Once entry into the uterine cavity was  accomplished then meconium-stained amniotic fluid was encountered. The  incision was then extended bluntly with the 's fingers.   The  vertex was then carefully elevated out of the pelvis and then delivered  through the incision and then the remainder of the infant was delivered  easily with a modest amount of fundal pressure from the assistant. The  infant was suctioned on the operative field. The infant was vigorous  and crying immediately after delivery. The cord was doubly clamped and  cut and the infant was handed to the pediatric special care nurse in  attendance. The placenta was then delivered with traction. The uterus  was then exteriorized. Membranes were swept free from the interior  surface of the uterus. Fallopian tubes and ovaries noted to be normal.   There no extensions of the uterine incision. The incision was closed  with a running locking suture of 0 Monocryl followed by an imbricating  layer of 0 Monocryl. The uterus was replaced back into the abdomen and  debris and clots were then swept free from the pelvis and the uterine  incision was reinspected. There were two small areas in the midline  which were continuing to ooze blood. These were controlled with three  figure-of-eight sutures of 0 Monocryl for excellent hemostasis. At this  point, the peritoneum was then closed with a running suture of 3-0  Vicryl. The subfascial layers and rectus muscles were inspected for  bleeding, none was found. The fascia was then closed with a running  suture of 0 Maxon. The subcutaneous fat was then reapproximated with a  running suture of 3-0 Vicryl. The skin was then closed with a running  subcuticular suture of 3-0 Monocryl. Dermabond glue was placed over the  incision. The patient was then taken to recovery room in satisfactory  condition. All sponge, needle and instrument counts were correct. There were no complications. Estimated blood loss was 600 mL. Quantitative blood loss calculated intraoperatively was 604 mL. FINDINGS:  The patient has delivered of a viable female infant, Apgars 9  at one minute and 9 at five minutes . Birthweight was 7 pounds 11  ounces. Normal uterus, tubes and ovaries bilaterally.         Lady Alexander MD    D: 02/22/2023 14:45:03       T: 02/22/2023 14:49:18     [email or web address]  Job#: [id number]     Doc#: [id number]    CC:

## 2023-02-23 NOTE — PROGRESS NOTES
Assisted pt, with help from another Tech,  to the bathroom. Gait steady. Pt tolerated well. Linens changed.

## 2023-02-23 NOTE — ANESTHESIA POSTPROCEDURE EVALUATION
Department of Anesthesiology  Postprocedure Note    Patient: Forest Suarez  MRN: [id number]  [place]: 1995  Date of evaluation: 2023      Procedure Summary     Date: 23 Room / Location: [facility]    Anesthesia Start: 1106 Anesthesia Stop: 23 1452    Procedures:        SECTION (Abdomen)      Labor Analgesia Diagnosis:       Delivery by  section at 37-39 weeks of gestation due to labor      (FTP)    Surgeons: Karen Gibson MD Responsible Provider: Reynold Colon MD    Anesthesia Type: CSE ASA Status: 2          Anesthesia Type: No value filed. Cresencio Phase I: Cresencio Score: 9    Cresencio Phase II: Cresencio Score: 10      Anesthesia Post Evaluation    Level of consciousness: awake  Complications: no  Cardiovascular status: hemodynamically stable  Respiratory status: acceptable  Comments: No apparent complications from neuraxial anesthesia.

## 2023-02-24 VITALS
HEIGHT: 66 IN | SYSTOLIC BLOOD PRESSURE: 127 MMHG | TEMPERATURE: 98 F | DIASTOLIC BLOOD PRESSURE: 76 MMHG | BODY MASS INDEX: 29.57 KG/M2 | HEART RATE: 59 BPM | OXYGEN SATURATION: 100 % | WEIGHT: 184 LBS | RESPIRATION RATE: 16 BRPM

## 2023-02-24 PROCEDURE — 6370000000 HC RX 637 (ALT 250 FOR IP): Performed by: OBSTETRICS & GYNECOLOGY

## 2023-02-24 PROCEDURE — 6370000000 HC RX 637 (ALT 250 FOR IP): Performed by: STUDENT IN AN ORGANIZED HEALTH CARE EDUCATION/TRAINING PROGRAM

## 2023-02-24 RX ORDER — IBUPROFEN 800 MG/1
800 TABLET ORAL EVERY 6 HOURS PRN
Qty: 30 TABLET | Refills: 1 | Status: SHIPPED | OUTPATIENT
Start: 2023-02-24

## 2023-02-24 RX ORDER — OXYCODONE HYDROCHLORIDE 5 MG/1
5 TABLET ORAL EVERY 4 HOURS PRN
Qty: 20 TABLET | Refills: 0 | Status: SHIPPED | OUTPATIENT
Start: 2023-02-24 | End: 2023-02-27

## 2023-02-24 RX ORDER — PSEUDOEPHEDRINE HCL 30 MG
100 TABLET ORAL 2 TIMES DAILY
Qty: 60 CAPSULE | Refills: 0 | Status: SHIPPED | OUTPATIENT
Start: 2023-02-24

## 2023-02-24 RX ADMIN — OXYCODONE 5 MG: 5 TABLET ORAL at 02:12

## 2023-02-24 RX ADMIN — DOCUSATE SODIUM 100 MG: 100 CAPSULE, LIQUID FILLED ORAL at 08:08

## 2023-02-24 RX ADMIN — ACETAMINOPHEN 1000 MG: 500 TABLET ORAL at 05:34

## 2023-02-24 RX ADMIN — OXYCODONE 10 MG: 5 TABLET ORAL at 12:16

## 2023-02-24 RX ADMIN — IBUPROFEN 800 MG: 800 TABLET, FILM COATED ORAL at 08:08

## 2023-02-24 RX ADMIN — METFORMIN HYDROCHLORIDE 1 TABLET: 500 TABLET, EXTENDED RELEASE ORAL at 08:07

## 2023-02-24 ASSESSMENT — PAIN SCALES - GENERAL
PAINLEVEL_OUTOF10: 10
PAINLEVEL_OUTOF10: 5
PAINLEVEL_OUTOF10: 4
PAINLEVEL_OUTOF10: 4

## 2023-02-24 ASSESSMENT — PAIN - FUNCTIONAL ASSESSMENT
PAIN_FUNCTIONAL_ASSESSMENT: ACTIVITIES ARE NOT PREVENTED
PAIN_FUNCTIONAL_ASSESSMENT: ACTIVITIES ARE NOT PREVENTED

## 2023-02-24 ASSESSMENT — PAIN DESCRIPTION - DESCRIPTORS
DESCRIPTORS: DULL
DESCRIPTORS: BURNING

## 2023-02-24 ASSESSMENT — PAIN DESCRIPTION - LOCATION
LOCATION: ABDOMEN

## 2023-02-24 ASSESSMENT — PAIN DESCRIPTION - ORIENTATION: ORIENTATION: LEFT;LOWER

## 2023-02-24 NOTE — PROGRESS NOTES
Pt discharged in stable condition via wheelchair with baby in car set in her lap.      She verbalized understanding of all dc education

## 2023-02-24 NOTE — DISCHARGE SUMMARY
Obstetrical Discharge Form    Gestational Age:40w3d    Antepartum complications: pregnancy induced hypertension    Date of Delivery: 2023      Type of Delivery:  for failure to progress    Delivered By:    Dr. Yelitza Ayala:   Information for the patient's :  Darryl Baby Girl Jessica Forde [[id number]]      Anesthesia: Epidural    Intrapartum complications: Failure to Progress    Postpartum complications: none    Discharge Medication:      Medication List        START taking these medications      docusate 100 MG Caps  Commonly known as: COLACE, DULCOLAX  Take 100 mg by mouth 2 times daily     ibuprofen 800 MG tablet  Commonly known as: ADVIL;MOTRIN  Take 1 tablet by mouth every 6 hours as needed for Pain     oxyCODONE 5 MG immediate release tablet  Commonly known as: ROXICODONE  Take 1 tablet by mouth every 4 hours as needed for Pain for up to 3 days. Max Daily Amount: 30 mg            CONTINUE taking these medications      PRENATAL 1 PO            STOP taking these medications      cephALEXin 250 MG capsule  Commonly known as: KEFLEX     Latanoprost-Timolol Maleate 0.005-0.5 % Soln     medroxyPROGESTERone 10 MG tablet  Commonly known as: PROVERA               Where to Get Your Medications        These medications were sent to [address] [facility] [place] [address]      Phone: [phone]   docusate 100 MG Caps  ibuprofen 800 MG tablet       You can get these medications from any pharmacy    Bring a paper prescription for each of these medications  oxyCODONE 5 MG immediate release tablet          Discharge Date: 23    Condition on discharge: Stable    Plan:   Follow up 1-2 weeks for BP check. 4-6 weeks for postpartum visit.   Reviewed discharge instructions and scripts     Vin Vanegas MD

## 2023-02-24 NOTE — PROGRESS NOTES
Subjective:   [Late entry]  Postpartum Day 1:  Delivery    The patient feels well. The patient denies emotional concerns. Pain is well controlled with current medications. The baby iswell. Baby is feeding via breast. Urinary output is adequate. The patient is ambulating well. The patient is tolerating a normal diet. Flatus has been passed. Objective:    VITALS:  /76   Pulse 59   Temp 98 °F (36.7 °C) (Oral)   Resp 16   Ht 5' 6\" (1.676 m)   Wt 184 lb (83.5 kg)   SpO2 100%   Breastfeeding Unknown   BMI 29.70 kg/m²     Vitals:    23 0810   BP: 127/76   Pulse: 59   Resp: 16   Temp: 98 °F (36.7 °C)   SpO2:          General:    alert, appears stated age, and cooperative   Lochia:  appropriate   Uterine Fundus:   firm   Incision:  healing well, no significant drainage, no dehiscence, no significant erythema   DVT Evaluation:  No evidence of DVT seen on physical exam.     CBC   Lab Results   Component Value Date    WBC 17.9 (H) 2023    HGB 12.8 2023    HCT 36.3 2023    MCV 98.2 2023     2023        Assessment:     Status post  section. Doing well postoperatively. Plan:     Continue current care.     Mercy Greer MD

## 2023-02-24 NOTE — PROGRESS NOTES
Discharge Phone Call    Patient Name: Ashanti Mckeon     [facility] Care Provider: Clementina Aguirre MD Discharge Date: 2023    Disposition of baby:    Phone Number: [phone] (home)     Attempts to Contact:  Date:    Caller  Date:    Caller  Date:    Caller    Information for the patient's :  Arlen Wild Girl Gutierrez [place] [[id number]]   Delivery Method: , Low Transverse     1. Now that you are at home is your pain being well controlled? Y/N   If no, instruct to call       provider. 2. Are you breastfeeding? Y/N    Do you need any extra support from our lactation staff? Y/N    If yes, provide number for lactation. 3. Have you made or already had your first appointment with the baby's doctor? Y/N   If no, do      you know when to schedule it? Y/N    4. Have you scheduled your follow-up appointment? Y/N  If no, do you know when to schedule       it? Y/N   If no, they can find it on printed discharge instructions. 5. Did staff discuss safe sleep during your stay? Y/N   6. Did we explain things in a way you could understand? Y/N  7. Were we respectful of your preferences for labor and birth and include you in the plan of       care? Y/N  If no, please explain _______________________________________________  8. Is there anyone in particular you would like to mention who provided care for you? _______      _________________________________________________________________________     9. Were you given a Post-Birth Warning Signs handout? Y/N  Do you have it somewhere      easily accessible? Y/N  If no, please send them a copy and ask them to put it somewhere      easily found. 10. Have you been crying excessively, having anger or mood swings that feel out of control, or       feel like you can't cope with caring for yourself or baby? Y/N   If yes, they may be showing       signs of postpartum depression and should call provider.  There is also a        depression test on page C5 in their discharge booklet they can take. 13. Do you have any other questions or concerns I can address today?  Y/N  ______________      _________________________________________________________________________    Information provided during call :_________________________________________________  ___________________________________________________________________________    Call completed by:____________________________    Date:_________ Time:___________

## 2023-02-24 NOTE — PROGRESS NOTES
Subjective:   Postpartum Day 2:  Delivery    The patient feels well. The patient denies emotional concerns. Pain is well controlled with current medications. The baby iswell. Baby is feeding via breast. The patient is ambulating and voiding well. The patient is tolerating a normal diet. Flatus has been passed. Objective:    VITALS:  /76   Pulse 59   Temp 98 °F (36.7 °C) (Oral)   Resp 16   Ht 5' 6\" (1.676 m)   Wt 184 lb (83.5 kg)   SpO2 100%   Breastfeeding Unknown   BMI 29.70 kg/m²     Vitals:    23 0810   BP: 127/76   Pulse: 59   Resp: 16   Temp: 98 °F (36.7 °C)   SpO2:          General:    alert, appears stated age, and cooperative   Lochia:  appropriate   Uterine Fundus:   firm   Incision:  healing well, no significant drainage, no dehiscence, no significant erythema   DVT Evaluation:  No evidence of DVT seen on physical exam.     CBC   Lab Results   Component Value Date    WBC 17.9 (H) 2023    HGB 12.8 2023    HCT 36.3 2023    MCV 98.2 2023     2023        Assessment:     Status post  section. Doing well postoperatively. TN    Plan:     Continue current care.   Discharge today; F/U in 4-6 weeks; 2 week BP check    Deirdre Mejia MD

## 2023-02-24 NOTE — LACTATION NOTE
This note was copied from a baby's chart. Lactation Progress Note      Data:    Follow up consult for primip on ay 2 po with an infant born at 40.3 weeks gestation. MOB reports breast feeding has been going ok but both her nipples are sore & red. MOB states infant cluster fed some last night. MOB has a history of PCOS. Feeding Method: Breast feeding and syringe EBM  Urine output:  established   Stool output:  established  Percent weight change from birth:  -5%        Action:    Introduced self & ensured name & lactation # is on whiteboard in room. Reviewed breast feeding education & assessed sore nipples. Mothers nipples are red but intact with no cracks or bleeding. Educated MOB about healing techniques for sore nipples & encouraged her to call with next feed for a latch check. All questions answered. Response:    MOB verbalized an understanding of education provided and will call for assistance as needed.
